# Patient Record
Sex: FEMALE | Race: WHITE | Employment: OTHER | URBAN - METROPOLITAN AREA
[De-identification: names, ages, dates, MRNs, and addresses within clinical notes are randomized per-mention and may not be internally consistent; named-entity substitution may affect disease eponyms.]

---

## 2023-09-15 ENCOUNTER — TELEPHONE (OUTPATIENT)
Age: 88
End: 2023-09-15

## 2023-09-15 NOTE — TELEPHONE ENCOUNTER
Kristy thorne-friend. States she is [address] will bring the POA paper work to us. Patient refused to come to appt today with us. Has another appt the 21 of sept. But says she will not come. Had very little food in house but kristy has taken care of that. Says she has bleeding on incontinence pads. Sleeping a lot. Had diarrhea today, incontinnet. Just feels she is not well, she has been patients neighbor and friend for over 30 years. Feels she has dementia. Lives alone, kristy checks on her daily.  advise

## 2023-09-16 PROCEDURE — 96365 THER/PROPH/DIAG IV INF INIT: CPT

## 2023-09-16 PROCEDURE — 99285 EMERGENCY DEPT VISIT HI MDM: CPT

## 2023-09-16 PROCEDURE — 96375 TX/PRO/DX INJ NEW DRUG ADDON: CPT

## 2023-09-16 PROCEDURE — 96361 HYDRATE IV INFUSION ADD-ON: CPT

## 2023-09-16 PROCEDURE — 96366 THER/PROPH/DIAG IV INF ADDON: CPT

## 2023-09-17 ENCOUNTER — APPOINTMENT (OUTPATIENT)
Dept: GENERAL RADIOLOGY | Age: 88
DRG: 840 | End: 2023-09-17
Payer: MEDICARE

## 2023-09-17 ENCOUNTER — HOSPITAL ENCOUNTER (EMERGENCY)
Age: 88
Discharge: ANOTHER HOSPITAL | DRG: 840 | End: 2023-09-17
Attending: EMERGENCY MEDICINE
Payer: MEDICARE

## 2023-09-17 ENCOUNTER — HOSPITAL ENCOUNTER (INPATIENT)
Age: 88
LOS: 12 days | Discharge: NURSING, REHAB OR LONG-TERM CARE | DRG: 840 | End: 2023-09-29
Attending: INTERNAL MEDICINE
Payer: MEDICARE

## 2023-09-17 VITALS
BODY MASS INDEX: 24.8 KG/M2 | TEMPERATURE: 98 F | OXYGEN SATURATION: 95 % | HEART RATE: 53 BPM | DIASTOLIC BLOOD PRESSURE: 82 MMHG | SYSTOLIC BLOOD PRESSURE: 155 MMHG | RESPIRATION RATE: 16 BRPM | WEIGHT: 140 LBS | HEIGHT: 63 IN

## 2023-09-17 DIAGNOSIS — E87.5 HYPERKALEMIA: ICD-10-CM

## 2023-09-17 DIAGNOSIS — N17.9 ACUTE RENAL FAILURE, UNSPECIFIED ACUTE RENAL FAILURE TYPE (HCC): Primary | ICD-10-CM

## 2023-09-17 DIAGNOSIS — R60.9 EDEMA, UNSPECIFIED TYPE: Primary | ICD-10-CM

## 2023-09-17 PROBLEM — K26.9 DUODENAL ULCER: Status: ACTIVE | Noted: 2018-02-07

## 2023-09-17 PROBLEM — I26.99 PULMONARY EMBOLISM (HCC): Status: ACTIVE | Noted: 2017-12-01

## 2023-09-17 LAB
ANION GAP SERPL CALCULATED.3IONS-SCNC: 15 MMOL/L (ref 9–17)
ANION GAP SERPL CALCULATED.3IONS-SCNC: 17 MMOL/L (ref 9–17)
ANION GAP SERPL CALCULATED.3IONS-SCNC: 17 MMOL/L (ref 9–17)
B PARAP IS1001 DNA NPH QL NAA+NON-PROBE: NOT DETECTED
B PERT DNA SPEC QL NAA+PROBE: NOT DETECTED
BACTERIA URNS QL MICRO: ABNORMAL
BASOPHILS # BLD: 0.03 K/UL (ref 0–0.2)
BASOPHILS # BLD: 0.03 K/UL (ref 0–0.2)
BASOPHILS NFR BLD: 0 % (ref 0–2)
BASOPHILS NFR BLD: 0 % (ref 0–2)
BILIRUB UR QL STRIP: NEGATIVE
BILIRUB UR QL STRIP: NEGATIVE
BNP SERPL-MCNC: 3153 PG/ML
BUN SERPL-MCNC: 62 MG/DL (ref 8–23)
BUN SERPL-MCNC: 65 MG/DL (ref 8–23)
BUN SERPL-MCNC: 70 MG/DL (ref 8–23)
C PNEUM DNA NPH QL NAA+NON-PROBE: NOT DETECTED
CALCIUM SERPL-MCNC: 8.7 MG/DL (ref 8.6–10.4)
CALCIUM SERPL-MCNC: 9.2 MG/DL (ref 8.6–10.4)
CALCIUM SERPL-MCNC: 9.3 MG/DL (ref 8.6–10.4)
CHARACTER UR: ABNORMAL
CHLORIDE SERPL-SCNC: 100 MMOL/L (ref 98–107)
CHLORIDE SERPL-SCNC: 104 MMOL/L (ref 98–107)
CHLORIDE SERPL-SCNC: 95 MMOL/L (ref 98–107)
CLARITY UR: CLEAR
CLARITY UR: CLEAR
CO2 SERPL-SCNC: 18 MMOL/L (ref 20–31)
CO2 SERPL-SCNC: 19 MMOL/L (ref 20–31)
CO2 SERPL-SCNC: 23 MMOL/L (ref 20–31)
COLOR UR: YELLOW
COLOR UR: YELLOW
CREAT SERPL-MCNC: 8.5 MG/DL (ref 0.5–0.9)
CREAT SERPL-MCNC: 8.6 MG/DL (ref 0.5–0.9)
CREAT SERPL-MCNC: 9 MG/DL (ref 0.5–0.9)
EOSINOPHIL # BLD: 0.08 K/UL (ref 0–0.44)
EOSINOPHIL # BLD: 0.16 K/UL (ref 0–0.4)
EOSINOPHILS RELATIVE PERCENT: 1 % (ref 1–4)
EOSINOPHILS RELATIVE PERCENT: 2 % (ref 1–4)
EPI CELLS #/AREA URNS HPF: ABNORMAL /HPF (ref 0–5)
EPI CELLS #/AREA URNS HPF: ABNORMAL /HPF (ref 0–5)
ERYTHROCYTE [DISTWIDTH] IN BLOOD BY AUTOMATED COUNT: 13.4 % (ref 11.8–14.4)
ERYTHROCYTE [DISTWIDTH] IN BLOOD BY AUTOMATED COUNT: 14.2 % (ref 12.5–15.4)
FLUAV RNA NPH QL NAA+NON-PROBE: NOT DETECTED
FLUBV RNA NPH QL NAA+NON-PROBE: NOT DETECTED
GFR SERPL CREATININE-BSD FRML MDRD: 4 ML/MIN/1.73M2
GLUCOSE BLD-MCNC: 101 MG/DL (ref 65–105)
GLUCOSE BLD-MCNC: 119 MG/DL (ref 65–105)
GLUCOSE BLD-MCNC: 173 MG/DL (ref 65–105)
GLUCOSE BLD-MCNC: 89 MG/DL (ref 65–105)
GLUCOSE BLD-MCNC: 92 MG/DL (ref 65–105)
GLUCOSE SERPL-MCNC: 102 MG/DL (ref 70–99)
GLUCOSE SERPL-MCNC: 97 MG/DL (ref 70–99)
GLUCOSE SERPL-MCNC: 99 MG/DL (ref 70–99)
GLUCOSE UR STRIP-MCNC: NEGATIVE MG/DL
GLUCOSE UR STRIP-MCNC: NEGATIVE MG/DL
HADV DNA NPH QL NAA+NON-PROBE: NOT DETECTED
HCOV 229E RNA NPH QL NAA+NON-PROBE: NOT DETECTED
HCOV HKU1 RNA NPH QL NAA+NON-PROBE: NOT DETECTED
HCOV NL63 RNA NPH QL NAA+NON-PROBE: NOT DETECTED
HCOV OC43 RNA NPH QL NAA+NON-PROBE: NOT DETECTED
HCT VFR BLD AUTO: 35 % (ref 36–46)
HCT VFR BLD AUTO: 37.2 % (ref 36.3–47.1)
HGB BLD-MCNC: 11.2 G/DL (ref 12–16)
HGB BLD-MCNC: 11.9 G/DL (ref 11.9–15.1)
HGB UR QL STRIP.AUTO: ABNORMAL
HGB UR QL STRIP.AUTO: NEGATIVE
HMPV RNA NPH QL NAA+NON-PROBE: NOT DETECTED
HPIV1 RNA NPH QL NAA+NON-PROBE: NOT DETECTED
HPIV2 RNA NPH QL NAA+NON-PROBE: NOT DETECTED
HPIV3 RNA NPH QL NAA+NON-PROBE: NOT DETECTED
HPIV4 RNA NPH QL NAA+NON-PROBE: NOT DETECTED
IMM GRANULOCYTES # BLD AUTO: 0.04 K/UL (ref 0–0.3)
IMM GRANULOCYTES NFR BLD: 1 %
INR PPP: 1.2
KETONES UR STRIP-MCNC: NEGATIVE MG/DL
KETONES UR STRIP-MCNC: NEGATIVE MG/DL
LEUKOCYTE ESTERASE UR QL STRIP: ABNORMAL
LEUKOCYTE ESTERASE UR QL STRIP: NEGATIVE
LYMPHOCYTES NFR BLD: 1.14 K/UL (ref 1.1–3.7)
LYMPHOCYTES NFR BLD: 1.28 K/UL (ref 1–4.8)
LYMPHOCYTES RELATIVE PERCENT: 15 % (ref 24–43)
LYMPHOCYTES RELATIVE PERCENT: 16 % (ref 24–44)
M PNEUMO DNA NPH QL NAA+NON-PROBE: NOT DETECTED
MCH RBC QN AUTO: 33.3 PG (ref 26–34)
MCH RBC QN AUTO: 33.6 PG (ref 25.2–33.5)
MCHC RBC AUTO-ENTMCNC: 32 G/DL (ref 28.4–34.8)
MCHC RBC AUTO-ENTMCNC: 32 G/DL (ref 31–37)
MCV RBC AUTO: 104.2 FL (ref 80–100)
MCV RBC AUTO: 105.1 FL (ref 82.6–102.9)
MONOCYTES NFR BLD: 0.77 K/UL (ref 0.1–1.2)
MONOCYTES NFR BLD: 0.88 K/UL (ref 0.1–1.2)
MONOCYTES NFR BLD: 10 % (ref 3–12)
MONOCYTES NFR BLD: 11 % (ref 2–11)
NEUTROPHILS NFR BLD: 71 % (ref 36–66)
NEUTROPHILS NFR BLD: 73 % (ref 36–65)
NEUTS SEG NFR BLD: 5.43 K/UL (ref 1.5–8.1)
NEUTS SEG NFR BLD: 5.7 K/UL (ref 1.8–7.7)
NITRITE UR QL STRIP: NEGATIVE
NITRITE UR QL STRIP: NEGATIVE
NRBC BLD-RTO: 0 PER 100 WBC
PARTIAL THROMBOPLASTIN TIME: 62.7 SEC (ref 23–36.5)
PH UR STRIP: 7 [PH] (ref 5–8)
PH UR STRIP: 8 [PH] (ref 5–8)
PLATELET # BLD AUTO: 208 K/UL (ref 138–453)
PLATELET # BLD AUTO: 251 K/UL (ref 140–450)
PMV BLD AUTO: 10.9 FL (ref 8.1–13.5)
PMV BLD AUTO: 11.6 FL (ref 8–14)
POTASSIUM SERPL-SCNC: 4.7 MMOL/L (ref 3.7–5.3)
POTASSIUM SERPL-SCNC: 5.4 MMOL/L (ref 3.7–5.3)
POTASSIUM SERPL-SCNC: 5.7 MMOL/L (ref 3.7–5.3)
POTASSIUM SERPL-SCNC: 6 MMOL/L (ref 3.7–5.3)
PROCALCITONIN SERPL-MCNC: 0.2 NG/ML
PROT UR STRIP-MCNC: ABNORMAL MG/DL
PROT UR STRIP-MCNC: ABNORMAL MG/DL
PROTHROMBIN TIME: 14.6 SEC (ref 11.7–14.9)
RBC # BLD AUTO: 3.36 M/UL (ref 4–5.2)
RBC # BLD AUTO: 3.54 M/UL (ref 3.95–5.11)
RBC # BLD: ABNORMAL 10*6/UL
RBC #/AREA URNS HPF: ABNORMAL /HPF (ref 0–2)
RBC #/AREA URNS HPF: ABNORMAL /HPF (ref 0–4)
RSV RNA NPH QL NAA+NON-PROBE: NOT DETECTED
RV+EV RNA NPH QL NAA+NON-PROBE: NOT DETECTED
SARS-COV-2 RNA NPH QL NAA+NON-PROBE: NOT DETECTED
SODIUM SERPL-SCNC: 133 MMOL/L (ref 135–144)
SODIUM SERPL-SCNC: 136 MMOL/L (ref 135–144)
SODIUM SERPL-SCNC: 139 MMOL/L (ref 135–144)
SODIUM UR-SCNC: 84 MMOL/L
SP GR UR STRIP: 1.01 (ref 1–1.03)
SP GR UR STRIP: 1.01 (ref 1–1.03)
SPECIMEN DESCRIPTION: NORMAL
TOTAL PROTEIN, URINE: 137 MG/DL
TROPONIN I SERPL HS-MCNC: 24 NG/L (ref 0–14)
TROPONIN I SERPL HS-MCNC: 26 NG/L (ref 0–14)
UROBILINOGEN UR STRIP-ACNC: NORMAL EU/DL (ref 0–1)
UROBILINOGEN UR STRIP-ACNC: NORMAL EU/DL (ref 0–1)
WBC #/AREA URNS HPF: ABNORMAL /HPF (ref 0–5)
WBC #/AREA URNS HPF: ABNORMAL /HPF (ref 0–5)
WBC OTHER # BLD: 7.5 K/UL (ref 3.5–11.3)
WBC OTHER # BLD: 8.1 K/UL (ref 3.5–11)

## 2023-09-17 PROCEDURE — 93005 ELECTROCARDIOGRAM TRACING: CPT | Performed by: EMERGENCY MEDICINE

## 2023-09-17 PROCEDURE — 6370000000 HC RX 637 (ALT 250 FOR IP): Performed by: EMERGENCY MEDICINE

## 2023-09-17 PROCEDURE — 81001 URINALYSIS AUTO W/SCOPE: CPT

## 2023-09-17 PROCEDURE — 87086 URINE CULTURE/COLONY COUNT: CPT

## 2023-09-17 PROCEDURE — 85610 PROTHROMBIN TIME: CPT

## 2023-09-17 PROCEDURE — 2580000003 HC RX 258

## 2023-09-17 PROCEDURE — 71045 X-RAY EXAM CHEST 1 VIEW: CPT

## 2023-09-17 PROCEDURE — 6370000000 HC RX 637 (ALT 250 FOR IP): Performed by: INTERNAL MEDICINE

## 2023-09-17 PROCEDURE — 87449 NOS EACH ORGANISM AG IA: CPT

## 2023-09-17 PROCEDURE — 2580000003 HC RX 258: Performed by: NURSE PRACTITIONER

## 2023-09-17 PROCEDURE — 84300 ASSAY OF URINE SODIUM: CPT

## 2023-09-17 PROCEDURE — 36415 COLL VENOUS BLD VENIPUNCTURE: CPT

## 2023-09-17 PROCEDURE — 84132 ASSAY OF SERUM POTASSIUM: CPT

## 2023-09-17 PROCEDURE — 6360000002 HC RX W HCPCS: Performed by: EMERGENCY MEDICINE

## 2023-09-17 PROCEDURE — 2500000003 HC RX 250 WO HCPCS: Performed by: EMERGENCY MEDICINE

## 2023-09-17 PROCEDURE — 2580000003 HC RX 258: Performed by: EMERGENCY MEDICINE

## 2023-09-17 PROCEDURE — 84484 ASSAY OF TROPONIN QUANT: CPT

## 2023-09-17 PROCEDURE — 99223 1ST HOSP IP/OBS HIGH 75: CPT | Performed by: INTERNAL MEDICINE

## 2023-09-17 PROCEDURE — 80048 BASIC METABOLIC PNL TOTAL CA: CPT

## 2023-09-17 PROCEDURE — 6370000000 HC RX 637 (ALT 250 FOR IP): Performed by: NURSE PRACTITIONER

## 2023-09-17 PROCEDURE — 85730 THROMBOPLASTIN TIME PARTIAL: CPT

## 2023-09-17 PROCEDURE — 84145 PROCALCITONIN (PCT): CPT

## 2023-09-17 PROCEDURE — 85025 COMPLETE CBC W/AUTO DIFF WBC: CPT

## 2023-09-17 PROCEDURE — 0202U NFCT DS 22 TRGT SARS-COV-2: CPT

## 2023-09-17 PROCEDURE — 84156 ASSAY OF PROTEIN URINE: CPT

## 2023-09-17 PROCEDURE — 2060000000 HC ICU INTERMEDIATE R&B

## 2023-09-17 PROCEDURE — 87324 CLOSTRIDIUM AG IA: CPT

## 2023-09-17 PROCEDURE — 6360000002 HC RX W HCPCS: Performed by: INTERNAL MEDICINE

## 2023-09-17 PROCEDURE — 82947 ASSAY GLUCOSE BLOOD QUANT: CPT

## 2023-09-17 PROCEDURE — 87040 BLOOD CULTURE FOR BACTERIA: CPT

## 2023-09-17 PROCEDURE — 83880 ASSAY OF NATRIURETIC PEPTIDE: CPT

## 2023-09-17 PROCEDURE — 99222 1ST HOSP IP/OBS MODERATE 55: CPT | Performed by: INTERNAL MEDICINE

## 2023-09-17 RX ORDER — SODIUM CHLORIDE 0.9 % (FLUSH) 0.9 %
5-40 SYRINGE (ML) INJECTION EVERY 12 HOURS SCHEDULED
Status: DISCONTINUED | OUTPATIENT
Start: 2023-09-17 | End: 2023-09-29 | Stop reason: HOSPADM

## 2023-09-17 RX ORDER — ONDANSETRON 4 MG/1
4 TABLET, ORALLY DISINTEGRATING ORAL EVERY 8 HOURS PRN
Status: DISCONTINUED | OUTPATIENT
Start: 2023-09-17 | End: 2023-09-29 | Stop reason: HOSPADM

## 2023-09-17 RX ORDER — SODIUM CHLORIDE 9 MG/ML
INJECTION, SOLUTION INTRAVENOUS CONTINUOUS
Status: DISCONTINUED | OUTPATIENT
Start: 2023-09-17 | End: 2023-09-18

## 2023-09-17 RX ORDER — SODIUM CHLORIDE 0.9 % (FLUSH) 0.9 %
5-40 SYRINGE (ML) INJECTION PRN
Status: DISCONTINUED | OUTPATIENT
Start: 2023-09-17 | End: 2023-09-29 | Stop reason: HOSPADM

## 2023-09-17 RX ORDER — AMLODIPINE BESYLATE 10 MG/1
10 TABLET ORAL DAILY
Status: DISCONTINUED | OUTPATIENT
Start: 2023-09-18 | End: 2023-09-29 | Stop reason: HOSPADM

## 2023-09-17 RX ORDER — HEPARIN SODIUM 1000 [USP'U]/ML
2000 INJECTION, SOLUTION INTRAVENOUS; SUBCUTANEOUS PRN
Status: DISCONTINUED | OUTPATIENT
Start: 2023-09-17 | End: 2023-09-26 | Stop reason: ALTCHOICE

## 2023-09-17 RX ORDER — ACETAMINOPHEN 325 MG/1
650 TABLET ORAL EVERY 6 HOURS PRN
Status: DISCONTINUED | OUTPATIENT
Start: 2023-09-17 | End: 2023-09-20

## 2023-09-17 RX ORDER — DEXTROSE MONOHYDRATE 100 MG/ML
INJECTION, SOLUTION INTRAVENOUS
Status: COMPLETED
Start: 2023-09-17 | End: 2023-09-17

## 2023-09-17 RX ORDER — DEXTROSE MONOHYDRATE 100 MG/ML
INJECTION, SOLUTION INTRAVENOUS CONTINUOUS PRN
Status: DISCONTINUED | OUTPATIENT
Start: 2023-09-17 | End: 2023-09-29 | Stop reason: HOSPADM

## 2023-09-17 RX ORDER — SODIUM POLYSTYRENE SULFONATE 15 G/60ML
15 SUSPENSION ORAL; RECTAL
Status: COMPLETED | OUTPATIENT
Start: 2023-09-17 | End: 2023-09-17

## 2023-09-17 RX ORDER — AMLODIPINE BESYLATE 5 MG/1
5 TABLET ORAL DAILY
Status: DISCONTINUED | OUTPATIENT
Start: 2023-09-17 | End: 2023-09-17

## 2023-09-17 RX ORDER — LEVOTHYROXINE SODIUM 0.1 MG/1
100 TABLET ORAL DAILY
Status: DISCONTINUED | OUTPATIENT
Start: 2023-09-17 | End: 2023-09-29 | Stop reason: HOSPADM

## 2023-09-17 RX ORDER — AMLODIPINE BESYLATE 5 MG/1
5 TABLET ORAL ONCE
Status: COMPLETED | OUTPATIENT
Start: 2023-09-17 | End: 2023-09-18

## 2023-09-17 RX ORDER — ONDANSETRON 2 MG/ML
4 INJECTION INTRAMUSCULAR; INTRAVENOUS EVERY 6 HOURS PRN
Status: DISCONTINUED | OUTPATIENT
Start: 2023-09-17 | End: 2023-09-29 | Stop reason: HOSPADM

## 2023-09-17 RX ORDER — ACETAMINOPHEN 650 MG/1
650 SUPPOSITORY RECTAL EVERY 6 HOURS PRN
Status: DISCONTINUED | OUTPATIENT
Start: 2023-09-17 | End: 2023-09-29 | Stop reason: HOSPADM

## 2023-09-17 RX ORDER — DEXTROSE MONOHYDRATE 25 G/50ML
25 INJECTION, SOLUTION INTRAVENOUS ONCE
Status: DISCONTINUED | OUTPATIENT
Start: 2023-09-17 | End: 2023-09-17 | Stop reason: HOSPADM

## 2023-09-17 RX ORDER — HEPARIN SODIUM 5000 [USP'U]/ML
5000 INJECTION, SOLUTION INTRAVENOUS; SUBCUTANEOUS EVERY 8 HOURS SCHEDULED
Status: DISCONTINUED | OUTPATIENT
Start: 2023-09-17 | End: 2023-09-17

## 2023-09-17 RX ORDER — HEPARIN SODIUM 10000 [USP'U]/100ML
5-30 INJECTION, SOLUTION INTRAVENOUS CONTINUOUS
Status: DISCONTINUED | OUTPATIENT
Start: 2023-09-17 | End: 2023-09-21

## 2023-09-17 RX ORDER — CALCIUM GLUCONATE 94 MG/ML
1000 INJECTION, SOLUTION INTRAVENOUS ONCE
Status: COMPLETED | OUTPATIENT
Start: 2023-09-17 | End: 2023-09-17

## 2023-09-17 RX ORDER — INSULIN LISPRO 100 [IU]/ML
0-8 INJECTION, SOLUTION INTRAVENOUS; SUBCUTANEOUS
Status: DISCONTINUED | OUTPATIENT
Start: 2023-09-17 | End: 2023-09-26

## 2023-09-17 RX ORDER — 0.9 % SODIUM CHLORIDE 0.9 %
500 INTRAVENOUS SOLUTION INTRAVENOUS ONCE
Status: COMPLETED | OUTPATIENT
Start: 2023-09-17 | End: 2023-09-17

## 2023-09-17 RX ORDER — HYDRALAZINE HYDROCHLORIDE 20 MG/ML
10 INJECTION INTRAMUSCULAR; INTRAVENOUS EVERY 6 HOURS PRN
Status: DISCONTINUED | OUTPATIENT
Start: 2023-09-17 | End: 2023-09-27

## 2023-09-17 RX ORDER — INSULIN LISPRO 100 [IU]/ML
0-4 INJECTION, SOLUTION INTRAVENOUS; SUBCUTANEOUS NIGHTLY
Status: DISCONTINUED | OUTPATIENT
Start: 2023-09-17 | End: 2023-09-26

## 2023-09-17 RX ORDER — AMLODIPINE BESYLATE 5 MG/1
5 TABLET ORAL ONCE
Status: COMPLETED | OUTPATIENT
Start: 2023-09-17 | End: 2023-09-17

## 2023-09-17 RX ORDER — HEPARIN SODIUM 10000 [USP'U]/100ML
5-30 INJECTION, SOLUTION INTRAVENOUS CONTINUOUS
Status: DISCONTINUED | OUTPATIENT
Start: 2023-09-17 | End: 2023-09-17 | Stop reason: SDUPTHER

## 2023-09-17 RX ORDER — ATENOLOL 50 MG/1
50 TABLET ORAL DAILY
Status: DISCONTINUED | OUTPATIENT
Start: 2023-09-17 | End: 2023-09-17 | Stop reason: HOSPADM

## 2023-09-17 RX ORDER — SODIUM CHLORIDE 9 MG/ML
INJECTION, SOLUTION INTRAVENOUS PRN
Status: DISCONTINUED | OUTPATIENT
Start: 2023-09-17 | End: 2023-09-20

## 2023-09-17 RX ORDER — HEPARIN SODIUM 1000 [USP'U]/ML
4000 INJECTION, SOLUTION INTRAVENOUS; SUBCUTANEOUS PRN
Status: DISCONTINUED | OUTPATIENT
Start: 2023-09-17 | End: 2023-09-26 | Stop reason: ALTCHOICE

## 2023-09-17 RX ADMIN — CALCIUM GLUCONATE 1000 MG: 98 INJECTION, SOLUTION INTRAVENOUS at 02:23

## 2023-09-17 RX ADMIN — AMLODIPINE BESYLATE 5 MG: 5 TABLET ORAL at 08:59

## 2023-09-17 RX ADMIN — SODIUM CHLORIDE 500 ML: 9 INJECTION, SOLUTION INTRAVENOUS at 01:49

## 2023-09-17 RX ADMIN — LEVOTHYROXINE SODIUM 100 MCG: 100 TABLET ORAL at 15:36

## 2023-09-17 RX ADMIN — SODIUM ZIRCONIUM CYCLOSILICATE 5 G: 5 POWDER, FOR SUSPENSION ORAL at 13:08

## 2023-09-17 RX ADMIN — SODIUM ZIRCONIUM CYCLOSILICATE 5 G: 5 POWDER, FOR SUSPENSION ORAL at 20:31

## 2023-09-17 RX ADMIN — HYDRALAZINE HYDROCHLORIDE 10 MG: 20 INJECTION INTRAMUSCULAR; INTRAVENOUS at 20:31

## 2023-09-17 RX ADMIN — HEPARIN SODIUM 12 UNITS/KG/HR: 10000 INJECTION, SOLUTION INTRAVENOUS at 17:45

## 2023-09-17 RX ADMIN — HYDRALAZINE HYDROCHLORIDE 10 MG: 20 INJECTION INTRAMUSCULAR; INTRAVENOUS at 15:34

## 2023-09-17 RX ADMIN — SODIUM BICARBONATE 50 MEQ: 84 INJECTION INTRAVENOUS at 01:42

## 2023-09-17 RX ADMIN — SODIUM POLYSTYRENE SULFONATE 15 G: 15 SUSPENSION ORAL; RECTAL at 15:35

## 2023-09-17 RX ADMIN — SODIUM CHLORIDE: 9 INJECTION, SOLUTION INTRAVENOUS at 12:35

## 2023-09-17 RX ADMIN — INSULIN HUMAN 5 UNITS: 100 INJECTION, SOLUTION PARENTERAL at 01:43

## 2023-09-17 RX ADMIN — DEXTROSE MONOHYDRATE 250 ML: 100 INJECTION, SOLUTION INTRAVENOUS at 01:50

## 2023-09-17 ASSESSMENT — PAIN DESCRIPTION - DESCRIPTORS: DESCRIPTORS: OTHER (COMMENT)

## 2023-09-17 ASSESSMENT — PAIN DESCRIPTION - LOCATION: LOCATION: GENERALIZED

## 2023-09-17 ASSESSMENT — PAIN - FUNCTIONAL ASSESSMENT: PAIN_FUNCTIONAL_ASSESSMENT: 0-10

## 2023-09-17 NOTE — PROGRESS NOTES
Notified Dr. Kat Banegas that pt had incontinence of bladder and saturated brief and filled canister to 80 cc. PVR was 200. No new orders at this time.

## 2023-09-17 NOTE — CARE COORDINATION
Case Management Assessment  Initial Evaluation    Date/Time of Evaluation: 9/17/2023 3:52 PM  Assessment Completed by: Naresh Terry RN    If patient is discharged prior to next notation, then this note serves as note for discharge by case management. Patient Name: Julian Hanson                   YOB: 1934  Diagnosis: Acute kidney injury [facility]) [N17.9]  DARIUSZ (acute kidney injury) ([address]) [N17.9]                   Date / Time: 9/17/2023 12:17 PM    Patient Admission Status: Inpatient   Readmission Risk (Low < 19, Mod (19-27), High > 27): No data recorded  Current PCP: Amelia Garnett MD  PCP verified by CM? (P) Yes (Dr. Milad Thompson, neftaly)    Chart Reviewed: Yes      History Provided by: (P) Patient  Patient Orientation: (P) Alert and Oriented, Person, Place, Situation, Other (see comment), Self (could not remember the year.)    Patient Cognition: (P) Alert    Hospitalization in the last 30 days (Readmission):  No    If yes, Readmission Assessment in CM Navigator will be completed.     Advance Directives:      Code Status: Limited   Patient's Primary Decision Maker is: (P) Named in Scanned ACP Document      Discharge Planning:    Patient lives with: (P) Alone Type of Home: (P) House  Primary Care Giver:    Patient Support Systems include: (P) Family Members, Friends/Neighbors   Current Financial resources:    Current community resources:    Current services prior to admission: (P) Durable Medical Equipment            Current DME: (P) Walker, Shower Chair            Type of Home Care services:  Nursing Services    ADLS  Prior functional level: (P) Assistance with the following:, Cooking, Housework, Shopping, Mobility, Other (see comment) (Transportation)  Current functional level: (P) Assistance with the following:, Cooking, Housework, Shopping, Mobility, Other (see comment) (Transportation)    PT AM-PAC:   /24  OT AM-PAC:   /24    Family can provide assistance at DC: (P) Other (comment) (Friends able to

## 2023-09-17 NOTE — H&P
[facility]  Office: [phone]  Stefani Joy DO, Leticia Reyes DO, Sofiya Gann DO, Jose Suh MD, Angela Andrade MD, Artur Sanderson MD, Yandel Garcia MD,  Kenny Peacock MD, Ty Clark MD, Nic George DO, Stephane Arias MD,  Wing Bhaskar MD, Anny Ross MD, Joan Noel DO, Rin Kaufman MD,  Han Donis MD, Michael Ashley MD, Bairon Garcia MD, Michael Garcia MD,  Ji Naik MD, Cynthia Hansen MD, Yecenia Mckeon MD, Fatmata Ortiz MD, Adelso Rothman DO, Lexx Mckeon MD,  José Manuel Carr MD, Ariadna Torres MD, Henri Matute, CNP,  Christa Downey, CNP,, Mechelle Brock, CNP,  Adelina Tucker, [facility], Joseph Thakur, CNP, Dimitrios Christianson, CNP, Alexsander Rocha, CNP, Rafa Hogue, CNP, Kristofer Russell, CNP, Naresh Michele, CNP, Jennie Becerril, CNS, Irene Marie, CNP, Gisele Camarillo, [address] [place] [address]    HISTORY AND PHYSICAL EXAMINATION            Date:   9/17/2023  Patient name:  Ariadna Torers  Date of admission:  9/17/2023 12:17 PM  MRN:   [id number]  Account:  [de-identified]  YOB: 1934  PCP:    Moustapha Andersen MD  Room:   [facility] [place]050-  Code Status:    Limited    Chief Complaint:     Fatigue, weakness    History Obtained From:     patient, electronic medical record, neighbours    History of Present Illness: This is 80years old female who was transferred to us from [facility].  Patient presented to the ER with 2 weeks history of weakness and lethargy. Patient was found to be in acute kidney injury with creatinine of more than 8 and potassium of more than 6. She was transferred over here for further evaluation. Patient says that she has not been feeling well for the last 2 weeks. She just did not have an appetite. She was not eating and drinking well. Denies chest pain. Denies nausea or vomiting.   She also with Auto Differential    Collection Time: 09/17/23 12:57 PM   Result Value Ref Range    WBC 7.5 3.5 - 11.3 k/uL    RBC 3.54 (L) 3.95 - 5.11 m/uL    Hemoglobin 11.9 11.9 - 15.1 g/dL    Hematocrit 37.2 36.3 - 47.1 %    .1 (H) 82.6 - 102.9 fL    MCH 33.6 (H) 25.2 - 33.5 pg    MCHC 32.0 28.4 - 34.8 g/dL    RDW 13.4 11.8 - 14.4 %    Platelets 807 823 - 062 k/uL    MPV 10.9 8.1 - 13.5 fL    NRBC Automated 0.0 0.0 per 100 WBC    Neutrophils % 73 (H) 36 - 65 %    Lymphocytes % 15 (L) 24 - 43 %    Monocytes % 10 3 - 12 %    Eosinophils % 1 1 - 4 %    Basophils % 0 0 - 2 %    Immature Granulocytes 1 (H) 0 %    Neutrophils Absolute 5.43 1.50 - 8.10 k/uL    Lymphocytes Absolute 1.14 1.10 - 3.70 k/uL    Monocytes Absolute 0.77 0.10 - 1.20 k/uL    Eosinophils Absolute 0.08 0.00 - 0.44 k/uL    Basophils Absolute 0.03 0.00 - 0.20 k/uL    Absolute Immature Granulocyte 0.04 0.00 - 0.30 k/uL    RBC Morphology MACROCYTOSIS PRESENT    Basic Metabolic Panel w/ Reflex to MG    Collection Time: 09/17/23 12:57 PM   Result Value Ref Range    Sodium 136 135 - 144 mmol/L    Potassium 5.7 (H) 3.7 - 5.3 mmol/L    Chloride 100 98 - 107 mmol/L    CO2 19 (L) 20 - 31 mmol/L    Anion Gap 17 9 - 17 mmol/L    Glucose 97 70 - 99 mg/dL    BUN 65 (H) 8 - 23 mg/dL    Creatinine 8.6 (HH) 0.5 - 0.9 mg/dL    Est, Glom Filt Rate 4 (L) >60 mL/min/1.73m2    Calcium 9.2 8.6 - 10.4 mg/dL   Troponin    Collection Time: 09/17/23 12:57 PM   Result Value Ref Range    Troponin, High Sensitivity 26 (H) 0 - 14 ng/L   Procalcitonin    Collection Time: 09/17/23 12:57 PM   Result Value Ref Range    Procalcitonin 0.20 (H) <0.09 ng/mL   POC Glucose Fingerstick    Collection Time: 09/17/23 12:58 PM   Result Value Ref Range    POC Glucose 92 65 - 105 mg/dL       Imaging/Diagnostics:  XR CHEST PORTABLE    Result Date: 9/17/2023  1. Pulmonary vascular congestion and mild to moderate cardiomegaly.   Minimal patchy airspace opacities in the mid to basilar lungs could
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
## 2023-09-17 NOTE — PROGRESS NOTES
Notified Dr. Elvin Shankar and Dr. Donna Onofre that patients K+ at noon was 5.7. Treated with PRN. See MAR.

## 2023-09-17 NOTE — ED NOTES
Writer gave nurse to nurse report to [facility].       Libby Perea LPN  02/77/37 0415

## 2023-09-18 ENCOUNTER — APPOINTMENT (OUTPATIENT)
Dept: CT IMAGING | Age: 88
DRG: 840 | End: 2023-09-18
Attending: INTERNAL MEDICINE
Payer: MEDICARE

## 2023-09-18 ENCOUNTER — APPOINTMENT (OUTPATIENT)
Dept: ULTRASOUND IMAGING | Age: 88
DRG: 840 | End: 2023-09-18
Attending: INTERNAL MEDICINE
Payer: MEDICARE

## 2023-09-18 ENCOUNTER — APPOINTMENT (OUTPATIENT)
Dept: INTERVENTIONAL RADIOLOGY/VASCULAR | Age: 88
DRG: 840 | End: 2023-09-18
Attending: INTERNAL MEDICINE
Payer: MEDICARE

## 2023-09-18 PROBLEM — R41.0 CONFUSION: Status: ACTIVE | Noted: 2023-09-18

## 2023-09-18 PROBLEM — E87.5 HYPERKALEMIA: Status: ACTIVE | Noted: 2023-09-18

## 2023-09-18 PROBLEM — Z71.89 GOALS OF CARE, COUNSELING/DISCUSSION: Status: ACTIVE | Noted: 2023-09-18

## 2023-09-18 PROBLEM — Z51.5 ENCOUNTER FOR PALLIATIVE CARE: Status: ACTIVE | Noted: 2023-09-18

## 2023-09-18 LAB
ALBUMIN SERPL-MCNC: 3.4 G/DL (ref 3.5–5.2)
ALBUMIN/GLOB SERPL: 1.3 {RATIO} (ref 1–2.5)
ALP SERPL-CCNC: 74 U/L (ref 35–104)
ALT SERPL-CCNC: 6 U/L (ref 5–33)
ANION GAP SERPL CALCULATED.3IONS-SCNC: 16 MMOL/L (ref 9–17)
AST SERPL-CCNC: 11 U/L
BASOPHILS # BLD: <0.03 K/UL (ref 0–0.2)
BASOPHILS NFR BLD: 0 % (ref 0–2)
BILIRUB SERPL-MCNC: 0.4 MG/DL (ref 0.3–1.2)
BILIRUB UR QL STRIP: NEGATIVE
BUN SERPL-MCNC: 62 MG/DL (ref 8–23)
C DIFF GDH + TOXINS A+B STL QL IA.RAPID: NEGATIVE
C3 SERPL-MCNC: 113 MG/DL (ref 90–180)
C4 SERPL-MCNC: 38 MG/DL (ref 10–40)
CALCIUM SERPL-MCNC: 8.6 MG/DL (ref 8.6–10.4)
CASTS #/AREA URNS LPF: ABNORMAL /LPF (ref 0–8)
CHLORIDE SERPL-SCNC: 103 MMOL/L (ref 98–107)
CHLORIDE UR-SCNC: 41 MMOL/L
CLARITY UR: CLEAR
CO2 SERPL-SCNC: 20 MMOL/L (ref 20–31)
COLOR UR: YELLOW
CREAT SERPL-MCNC: 8.4 MG/DL (ref 0.5–0.9)
CREAT UR-MCNC: 90.8 MG/DL (ref 28–217)
EKG ATRIAL RATE: 54 BPM
EKG ATRIAL RATE: 58 BPM
EKG P AXIS: 109 DEGREES
EKG P AXIS: 11 DEGREES
EKG P-R INTERVAL: 170 MS
EKG P-R INTERVAL: 174 MS
EKG Q-T INTERVAL: 440 MS
EKG Q-T INTERVAL: 448 MS
EKG QRS DURATION: 72 MS
EKG QRS DURATION: 78 MS
EKG QTC CALCULATION (BAZETT): 417 MS
EKG QTC CALCULATION (BAZETT): 439 MS
EKG R AXIS: 28 DEGREES
EKG R AXIS: 6 DEGREES
EKG T AXIS: 29 DEGREES
EKG T AXIS: 38 DEGREES
EKG VENTRICULAR RATE: 54 BPM
EKG VENTRICULAR RATE: 58 BPM
EOSINOPHIL # BLD: 0.03 K/UL (ref 0–0.44)
EOSINOPHILS RELATIVE PERCENT: 0 % (ref 1–4)
EPI CELLS #/AREA URNS HPF: ABNORMAL /HPF (ref 0–5)
ERYTHROCYTE [DISTWIDTH] IN BLOOD BY AUTOMATED COUNT: 14 % (ref 11.8–14.4)
FREE KAPPA/LAMBDA RATIO: 551.38 (ref 0.26–1.65)
GFR SERPL CREATININE-BSD FRML MDRD: 4 ML/MIN/1.73M2
GLUCOSE BLD-MCNC: 109 MG/DL (ref 65–105)
GLUCOSE BLD-MCNC: 114 MG/DL (ref 65–105)
GLUCOSE BLD-MCNC: 130 MG/DL (ref 65–105)
GLUCOSE BLD-MCNC: 95 MG/DL (ref 65–105)
GLUCOSE SERPL-MCNC: 100 MG/DL (ref 70–99)
GLUCOSE UR STRIP-MCNC: NEGATIVE MG/DL
HAV IGM SERPL QL IA: NONREACTIVE
HBV CORE AB SER QL: NONREACTIVE
HBV CORE IGM SERPL QL IA: NONREACTIVE
HBV SURFACE AB SERPL IA-ACNC: 40.83 MIU/ML
HBV SURFACE AG SERPL QL IA: NONREACTIVE
HBV SURFACE AG SERPL QL IA: NONREACTIVE
HCT VFR BLD AUTO: 33.6 % (ref 36.3–47.1)
HCT VFR BLD AUTO: 45.2 % (ref 36.3–47.1)
HCV AB SERPL QL IA: NONREACTIVE
HCV AB SERPL QL IA: NONREACTIVE
HGB BLD-MCNC: 11 G/DL (ref 11.9–15.1)
HGB BLD-MCNC: 14.9 G/DL (ref 11.9–15.1)
HGB UR QL STRIP.AUTO: ABNORMAL
IMM GRANULOCYTES # BLD AUTO: 0.05 K/UL (ref 0–0.3)
IMM GRANULOCYTES NFR BLD: 1 %
INR PPP: 1.3
KAPPA LC FREE SER-MCNC: 7168 MG/L (ref 3.7–19.4)
KETONES UR STRIP-MCNC: NEGATIVE MG/DL
LAMBDA LC FREE SERPL-MCNC: 13 MG/L (ref 5.7–26.3)
LEUKOCYTE ESTERASE UR QL STRIP: NEGATIVE
LYMPHOCYTES NFR BLD: 1.2 K/UL (ref 1.1–3.7)
LYMPHOCYTES RELATIVE PERCENT: 15 % (ref 24–43)
MAGNESIUM SERPL-MCNC: 2.1 MG/DL (ref 1.6–2.6)
MCH RBC QN AUTO: 34.3 PG (ref 25.2–33.5)
MCHC RBC AUTO-ENTMCNC: 32.7 G/DL (ref 28.4–34.8)
MCV RBC AUTO: 104.7 FL (ref 82.6–102.9)
MICROORGANISM SPEC CULT: NORMAL
MONOCYTES NFR BLD: 0.72 K/UL (ref 0.1–1.2)
MONOCYTES NFR BLD: 9 % (ref 3–12)
NEUTROPHILS NFR BLD: 74 % (ref 36–65)
NEUTS SEG NFR BLD: 5.82 K/UL (ref 1.5–8.1)
NITRITE UR QL STRIP: NEGATIVE
NRBC BLD-RTO: 0 PER 100 WBC
PARTIAL THROMBOPLASTIN TIME: 40.1 SEC (ref 23–36.5)
PARTIAL THROMBOPLASTIN TIME: >180 SEC (ref 23–36.5)
PH UR STRIP: 6.5 [PH] (ref 5–8)
PLATELET # BLD AUTO: 230 K/UL (ref 138–453)
PMV BLD AUTO: 11.4 FL (ref 8.1–13.5)
POTASSIUM SERPL-SCNC: 4.4 MMOL/L (ref 3.7–5.3)
PROCALCITONIN SERPL-MCNC: 0.2 NG/ML
PROT SERPL-MCNC: 6.1 G/DL (ref 6.4–8.3)
PROT UR STRIP-MCNC: ABNORMAL MG/DL
PROTHROMBIN TIME: 15.8 SEC (ref 11.7–14.9)
RBC # BLD AUTO: 3.21 M/UL (ref 3.95–5.11)
RBC # BLD: ABNORMAL 10*6/UL
RBC #/AREA URNS HPF: ABNORMAL /HPF (ref 0–4)
SODIUM SERPL-SCNC: 139 MMOL/L (ref 135–144)
SODIUM UR-SCNC: 61 MMOL/L
SP GR UR STRIP: 1.01 (ref 1–1.03)
SPECIMEN DESCRIPTION: NORMAL
SPECIMEN DESCRIPTION: NORMAL
T4 FREE SERPL-MCNC: 1 NG/DL (ref 0.9–1.7)
TOTAL PROTEIN, URINE: 252 MG/DL
TROPONIN I SERPL HS-MCNC: 29 NG/L (ref 0–14)
TSH SERPL DL<=0.05 MIU/L-ACNC: 8.26 UIU/ML (ref 0.3–5)
URINE TOTAL PROTEIN CREATININE RATIO: 2.78 (ref 0–0.2)
UROBILINOGEN UR STRIP-ACNC: NORMAL EU/DL (ref 0–1)
WBC #/AREA URNS HPF: ABNORMAL /HPF (ref 0–5)
WBC OTHER # BLD: 7.8 K/UL (ref 3.5–11.3)

## 2023-09-18 PROCEDURE — 05HP33Z INSERTION OF INFUSION DEVICE INTO RIGHT EXTERNAL JUGULAR VEIN, PERCUTANEOUS APPROACH: ICD-10-PCS | Performed by: RADIOLOGY

## 2023-09-18 PROCEDURE — 84165 PROTEIN E-PHORESIS SERUM: CPT

## 2023-09-18 PROCEDURE — 6360000002 HC RX W HCPCS: Performed by: INTERNAL MEDICINE

## 2023-09-18 PROCEDURE — 87340 HEPATITIS B SURFACE AG IA: CPT

## 2023-09-18 PROCEDURE — 84443 ASSAY THYROID STIM HORMONE: CPT

## 2023-09-18 PROCEDURE — 36415 COLL VENOUS BLD VENIPUNCTURE: CPT

## 2023-09-18 PROCEDURE — 86225 DNA ANTIBODY NATIVE: CPT

## 2023-09-18 PROCEDURE — 90935 HEMODIALYSIS ONE EVALUATION: CPT

## 2023-09-18 PROCEDURE — 85014 HEMATOCRIT: CPT

## 2023-09-18 PROCEDURE — 36556 INSERT NON-TUNNEL CV CATH: CPT

## 2023-09-18 PROCEDURE — 84156 ASSAY OF PROTEIN URINE: CPT

## 2023-09-18 PROCEDURE — 2060000000 HC ICU INTERMEDIATE R&B

## 2023-09-18 PROCEDURE — C1894 INTRO/SHEATH, NON-LASER: HCPCS

## 2023-09-18 PROCEDURE — 84155 ASSAY OF PROTEIN SERUM: CPT

## 2023-09-18 PROCEDURE — 86334 IMMUNOFIX E-PHORESIS SERUM: CPT

## 2023-09-18 PROCEDURE — 84145 PROCALCITONIN (PCT): CPT

## 2023-09-18 PROCEDURE — 83735 ASSAY OF MAGNESIUM: CPT

## 2023-09-18 PROCEDURE — 86704 HEP B CORE ANTIBODY TOTAL: CPT

## 2023-09-18 PROCEDURE — 93010 ELECTROCARDIOGRAM REPORT: CPT | Performed by: INTERNAL MEDICINE

## 2023-09-18 PROCEDURE — 80053 COMPREHEN METABOLIC PANEL: CPT

## 2023-09-18 PROCEDURE — 84484 ASSAY OF TROPONIN QUANT: CPT

## 2023-09-18 PROCEDURE — 84166 PROTEIN E-PHORESIS/URINE/CSF: CPT

## 2023-09-18 PROCEDURE — 99232 SBSQ HOSP IP/OBS MODERATE 35: CPT | Performed by: INTERNAL MEDICINE

## 2023-09-18 PROCEDURE — 86160 COMPLEMENT ANTIGEN: CPT

## 2023-09-18 PROCEDURE — 76775 US EXAM ABDO BACK WALL LIM: CPT

## 2023-09-18 PROCEDURE — 70450 CT HEAD/BRAIN W/O DYE: CPT

## 2023-09-18 PROCEDURE — 85730 THROMBOPLASTIN TIME PARTIAL: CPT

## 2023-09-18 PROCEDURE — 6370000000 HC RX 637 (ALT 250 FOR IP): Performed by: INTERNAL MEDICINE

## 2023-09-18 PROCEDURE — 51702 INSERT TEMP BLADDER CATH: CPT

## 2023-09-18 PROCEDURE — 80074 ACUTE HEPATITIS PANEL: CPT

## 2023-09-18 PROCEDURE — 93005 ELECTROCARDIOGRAM TRACING: CPT | Performed by: STUDENT IN AN ORGANIZED HEALTH CARE EDUCATION/TRAINING PROGRAM

## 2023-09-18 PROCEDURE — 84439 ASSAY OF FREE THYROXINE: CPT

## 2023-09-18 PROCEDURE — 77001 FLUOROGUIDE FOR VEIN DEVICE: CPT

## 2023-09-18 PROCEDURE — 83521 IG LIGHT CHAINS FREE EACH: CPT

## 2023-09-18 PROCEDURE — 6360000002 HC RX W HCPCS: Performed by: PHYSICIAN ASSISTANT

## 2023-09-18 PROCEDURE — 86803 HEPATITIS C AB TEST: CPT

## 2023-09-18 PROCEDURE — 86335 IMMUNFIX E-PHORSIS/URINE/CSF: CPT

## 2023-09-18 PROCEDURE — 86038 ANTINUCLEAR ANTIBODIES: CPT

## 2023-09-18 PROCEDURE — 74176 CT ABD & PELVIS W/O CONTRAST: CPT

## 2023-09-18 PROCEDURE — 2580000003 HC RX 258: Performed by: NURSE PRACTITIONER

## 2023-09-18 PROCEDURE — 82570 ASSAY OF URINE CREATININE: CPT

## 2023-09-18 PROCEDURE — 76937 US GUIDE VASCULAR ACCESS: CPT

## 2023-09-18 PROCEDURE — 99223 1ST HOSP IP/OBS HIGH 75: CPT

## 2023-09-18 PROCEDURE — 86317 IMMUNOASSAY INFECTIOUS AGENT: CPT

## 2023-09-18 PROCEDURE — 85610 PROTHROMBIN TIME: CPT

## 2023-09-18 PROCEDURE — 82947 ASSAY GLUCOSE BLOOD QUANT: CPT

## 2023-09-18 PROCEDURE — 85018 HEMOGLOBIN: CPT

## 2023-09-18 PROCEDURE — 85025 COMPLETE CBC W/AUTO DIFF WBC: CPT

## 2023-09-18 PROCEDURE — 82436 ASSAY OF URINE CHLORIDE: CPT

## 2023-09-18 PROCEDURE — 81001 URINALYSIS AUTO W/SCOPE: CPT

## 2023-09-18 PROCEDURE — 5A1D70Z PERFORMANCE OF URINARY FILTRATION, INTERMITTENT, LESS THAN 6 HOURS PER DAY: ICD-10-PCS | Performed by: INTERNAL MEDICINE

## 2023-09-18 PROCEDURE — 84300 ASSAY OF URINE SODIUM: CPT

## 2023-09-18 RX ORDER — HEPARIN SODIUM 1000 [USP'U]/ML
1000 INJECTION, SOLUTION INTRAVENOUS; SUBCUTANEOUS PRN
Status: DISCONTINUED | OUTPATIENT
Start: 2023-09-18 | End: 2023-09-26

## 2023-09-18 RX ORDER — LACTOBACILLUS RHAMNOSUS GG 10B CELL
1 CAPSULE ORAL
Status: DISCONTINUED | OUTPATIENT
Start: 2023-09-19 | End: 2023-09-29 | Stop reason: HOSPADM

## 2023-09-18 RX ORDER — HEPARIN SODIUM 1000 [USP'U]/ML
INJECTION, SOLUTION INTRAVENOUS; SUBCUTANEOUS PRN
Status: COMPLETED | OUTPATIENT
Start: 2023-09-18 | End: 2023-09-18

## 2023-09-18 RX ORDER — DOXYCYCLINE HYCLATE 100 MG
100 TABLET ORAL EVERY 12 HOURS SCHEDULED
Status: COMPLETED | OUTPATIENT
Start: 2023-09-18 | End: 2023-09-25

## 2023-09-18 RX ORDER — HEPARIN SODIUM 1000 [USP'U]/ML
1200 INJECTION, SOLUTION INTRAVENOUS; SUBCUTANEOUS PRN
Status: DISCONTINUED | OUTPATIENT
Start: 2023-09-18 | End: 2023-09-26

## 2023-09-18 RX ADMIN — HEPARIN SODIUM 1000 UNITS: 1000 INJECTION INTRAVENOUS; SUBCUTANEOUS at 19:15

## 2023-09-18 RX ADMIN — HEPARIN SODIUM 1000 UNITS: 1000 INJECTION, SOLUTION INTRAVENOUS; SUBCUTANEOUS at 16:45

## 2023-09-18 RX ADMIN — LEVOTHYROXINE SODIUM 100 MCG: 100 TABLET ORAL at 08:19

## 2023-09-18 RX ADMIN — SODIUM ZIRCONIUM CYCLOSILICATE 5 G: 5 POWDER, FOR SUSPENSION ORAL at 08:20

## 2023-09-18 RX ADMIN — SODIUM CHLORIDE, PRESERVATIVE FREE 10 ML: 5 INJECTION INTRAVENOUS at 21:21

## 2023-09-18 RX ADMIN — HEPARIN SODIUM 2000 UNITS: 1000 INJECTION INTRAVENOUS; SUBCUTANEOUS at 00:25

## 2023-09-18 RX ADMIN — HEPARIN SODIUM 14 UNITS/KG/HR: 10000 INJECTION, SOLUTION INTRAVENOUS at 00:27

## 2023-09-18 RX ADMIN — DOXYCYCLINE HYCLATE 100 MG: 100 TABLET ORAL at 21:21

## 2023-09-18 RX ADMIN — AMLODIPINE BESYLATE 5 MG: 5 TABLET ORAL at 00:42

## 2023-09-18 RX ADMIN — AMLODIPINE BESYLATE 10 MG: 10 TABLET ORAL at 08:19

## 2023-09-18 RX ADMIN — HEPARIN SODIUM 1200 UNITS: 1000 INJECTION, SOLUTION INTRAVENOUS; SUBCUTANEOUS at 16:44

## 2023-09-18 RX ADMIN — HYDRALAZINE HYDROCHLORIDE 10 MG: 20 INJECTION INTRAMUSCULAR; INTRAVENOUS at 01:50

## 2023-09-18 RX ADMIN — SODIUM CHLORIDE, PRESERVATIVE FREE 10 ML: 5 INJECTION INTRAVENOUS at 08:20

## 2023-09-18 RX ADMIN — HEPARIN SODIUM 1200 UNITS: 1000 INJECTION INTRAVENOUS; SUBCUTANEOUS at 19:15

## 2023-09-18 NOTE — ACP (ADVANCE CARE PLANNING)
Advance Care Planning     Advance Care Planning (ACP) Physician/NP/PA Conversation    Date of Conversation: 9/17/2023  Conducted with: Patient with Decision Making Capacity    Healthcare Decision Maker:    Primary Decision Maker: Delores Smith - [phone]  Click here to complete Healthcare Decision Makers including selection of the Healthcare Decision Maker Relationship (ie \"Primary\"). Care Preferences:    Hospitalization: \"If your health worsens and it becomes clear that your chance of recovery is unlikely, what would be your preference regarding hospitalization? \"  The patient would prefer hospitalization. Ventilation: \"If you were unable to breath on your own and your chance of recovery was unlikely, what would be your preference about the use of a ventilator (breathing machine) if it was available to you? \"  The patient would desire the use of a ventilator. Resuscitation: \"In the event your heart stopped as a result of an underlying serious health condition, would you want attempts made to restart your heart, or would you prefer a natural death? \"  Yes, attempt to resuscitate.     treatment goals    Conversation Outcomes / Follow-Up Plan:  ACP complete - no further action today  Reviewed DNR/DNI and patient elects Full Code (Attempt Resuscitation)    Length of Voluntary ACP Conversation in minutes:  <16 minutes (Non-Billable)    Lazarus Forester, APRN - CNP

## 2023-09-18 NOTE — PROGRESS NOTES
Patient was seen and examined on HD at bedside. Orders were confirmed with the HD nurse. Tolerating the procedure well. Will run again in a.m. as per day 2, currently running for 2 hours as day 1. Status post Luis catheter placed in by IR today. Arjun Whitten MD  [facility]     This note is created with the assistance of a speech-recognition program. While intending to generate a document that actually reflects the content of the visit, no guarantees can be provided that every mistake has been identified and corrected by editing.

## 2023-09-18 NOTE — CONSULTS
Renal Progress Note    Patient :  Saad Shen; 80 y.o. MRN# [id number]  Location:  1829/0369-63  Attending:  Mary Menjivar MD  Admit Date:  9/17/2023   Hospital Day: 1    Subjective:     Patient was seen and examined. Overnight the nurse did report positive confusion. Currently patient is seems to be appropriate. Oriented to self and place. Patient's Ayaz Ann will also present at bedside today. Patient has elevated creatinine with minimal improvement since admission, creatinine 8.4 today with BUN of 64, GFR 4. Sodium 139, potassium 4.4, chloride 103, bicarb 20, calcium 8.6.  UA did not show any sign of infection. Urine culture blood culture results pending. C. difficile negative. Patient did refuse bladder scan per nurse. After discussion with the nurse and the patient, Camacho catheter was placed in and she made about 300 cc of urine. Patient was on normal saline at 75 cc an hour.   Outpatient Medications:     Medications Prior to Admission: apixaban (ELIQUIS) 2.5 MG TABS tablet, Take 1 tablet by mouth 2 times daily  amLODIPine (NORVASC) 5 MG tablet, Take 1 tablet by mouth daily  atenolol (TENORMIN) 100 MG tablet, Take 1 tablet by mouth daily  ezetimibe (ZETIA) 10 MG tablet, Take 1 tablet by mouth daily  levothyroxine (SYNTHROID) 100 MCG tablet, Take 1 tablet by mouth daily  pantoprazole (PROTONIX) 40 MG tablet, Take 1 tablet by mouth daily (Patient not taking: Reported on 9/17/2023)  aspirin 81 MG EC tablet, Take 81 mg by mouth daily (Patient not taking: Reported on 9/17/2023)    Current Medications:     Scheduled Meds:    sodium chloride flush  5-40 mL IntraVENous 2 times per day    sodium zirconium cyclosilicate  5 g Oral TID    insulin lispro  0-8 Units SubCUTAneous TID WC    insulin lispro  0-4 Units SubCUTAneous Nightly    levothyroxine  100 mcg Oral Daily    amLODIPine  10 mg Oral Daily     Continuous Infusions:    sodium chloride Stopped (09/18/23 0913)    sodium chloride      dextrose CREATININE 8.6* 8.5* 8.4*   GLUCOSE 97 102* 100*   CALCIUM 9.2 8.7 8.6      Magnesium:    Recent Labs     09/18/23  0812   MG 2.1     Albumin:    Recent Labs     09/18/23  0812   LABALBU 3.4*     SPEP:  Lab Results   Component Value Date/Time    PROT 6.1 09/18/2023 08:12 AM     Urinalysis/Chemistries:      Lab Results   Component Value Date/Time    NITRU NEGATIVE 09/17/2023 04:21 PM    COLORU Yellow 09/17/2023 04:21 PM    PHUR 8.0 09/17/2023 04:21 PM    WBCUA 0 TO 2 09/17/2023 04:21 PM    RBCUA 5 TO 10 09/17/2023 04:21 PM    BACTERIA FEW 09/17/2023 02:57 AM    SPECGRAV 1.009 09/17/2023 04:21 PM    LEUKOCYTESUR NEGATIVE 09/17/2023 04:21 PM    UROBILINOGEN Normal 09/17/2023 04:21 PM    BILIRUBINUR NEGATIVE 09/17/2023 04:21 PM    GLUCOSEU NEGATIVE 09/17/2023 04:21 PM    KETUA NEGATIVE 09/17/2023 04:21 PM     Urine Sodium:     Lab Results   Component Value Date/Time    LISETTE 84 09/17/2023 04:21 PM     Radiology:     Reviewed. Assessment:     Acute Kidney Injury likely prerenal due to severe dehydration and further bleeding to ATN, baseline creatinine seems to normal, creatinine currently quite elevated and did not seem to respond to IV fluids possibly worsened with obstructive uropathy. Patient has been having some reported confusion overnight, did have hyperkalemia with electrolyte imbalance as well. Hyperkalemia-currently stable with medical management. Hypertension. Bradycardia. History of PE on Eliquis. Confusion/possible uremia. Urinary retention. Azotemia    Plan:   Okay to stop IV fluids for now. Will get a Camacho catheter placed in for strict I's and O's and due to urine retention obstructive apathy. Recommend getting urology on board as well. I did discuss in detail with the patient and her [address] about her current condition, code status was also discussed, she does not want any intubation or CPR. But she does agree with dialysis. 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   Dialysis related risks
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
## 2023-09-18 NOTE — BRIEF OP NOTE
Brief Postoperative Note Non Tunneled HD Catheter    Shantelle Cohen  YOB: 1934  [id number]    Pre-operative Diagnosis: Acute Renal Failure      Post-operative Diagnosis: Same    Procedure: Non tunneled Dialysis Catheter    Anesthesia: 1%Lidocaine     Surgeons/Assistants: Marvetta Brunner RedFox, PA-C    Estimated Blood Loss: Minimal    Complications: none    14 Fr x 15 cm non tunneled HD Catheter placed via Site:  Right External Jugular Vein. Sutured in place and sterile dressing applied. Locked with Heparin  May use HD cath for dialysis.     Electronically signed by ELLIS Duong on 9/18/2023 at 4:47 PM

## 2023-09-18 NOTE — CONSULTS
Palliative Care Inpatient Consult    NAME:  Marina Lopez  MEDICAL RECORD NUMBER:  [id number]  AGE: 80 y.o. GENDER: female  : 1934  TODAY'S DATE:  2023    Reasons for Consultation:    Symptom and/or pain management  Provision of information regarding PC and/or hospice philosophies  Complex, time-intensive communication and interdisciplinary psychosocial support  Clarification of goals of care and/or assistance with difficult decision-making  Guidance in regards to resources and transition(s)    Members of PC team contributing to this consultation are : Inna Jaramillo, Palliative Care APRN-CNP    Plan      Palliative Interaction: Patient seen and examined at bedside. Patient resting comfortably upon my arrival. I introduced myself and my palliative role in her care. Patient endorses feeling tired, stating she has not been able to sleep since coming into the hospital. 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 She lives alone, and has since her  passed away many years ago. She states they did not have any children and that her brothers and sisters live in [place]. Patient has completed [facility] paperwork naming her neighbor Kathlean Severe, copy on file in patients chart. I explain one of my goals is to understand the patients wishes in terms of medical care. Patient states \"I don't want to die\". We discuss resuscitative measures, patient states she wishes to receive CPR and mechanical ventilation should it be necessary. I inquire about dialysis, and patient is agreeable to this as well. Patient to remain a full code at this time. Palliative care will continue to follow.      Education/support to family  Education/support to patient  Discharge planning/helping to coordinate care  Communications with primary service  Clarification of medical condition to patient and family  Code status clarified: Full Code  Principle Problem/Diagnosis:  Acute kidney injury ([address])    Additional Assessments: deficits   Skin: Normal turgor, no bleeding, no bruising     Palliative Performance Scale:  __x_60%  Ambulation reduced; Significant disease; Can't do hobbies/housework; intake normal or reduced; occasional assist; LOC full/confusion  ___50%  Mainly sit/lie; Extensive disease; Can't do any work; Considerable assist; intake normal or reduced; LOC full/confusion  ___40%  Mainly in bed; Extensive disease; Mainly assist; intake normal or reduced; LOC full/confusion   ___30%  Bed Bound; Extensive disease; Total care; intake reduced; LOCfull/confusion  ___20%  Bed Bound; Extensive disease; Total care; intake minimal; Drowsy/coma  ___10%  Bed Bound; Extensive disease; Total care; Mouth care only; Drowsy/coma  ___0       Death        Thank you for allowing Palliative Care to participate in the care of Ms. Marciano Dumont . This note has been dictated by dragon, typing errors may be a possibility. The total time I spent in seeing the patient, discussing goals of care, advanced directives, code status and other major issues was more than 60 minutes      Electronically signed by   AARON Pastrana CNP  Palliative Care Team  on 9/18/2023 at 1:00 PM    Palliative Care can be reached via perfect serve.

## 2023-09-18 NOTE — PLAN OF CARE
Received a text from patient's nurse that patient wishes are to be resuscitated in case if her heart stopped. I went to see the patient and discuss CODE STATUS. She said that she would like to have everything done in case if her heart stopped. She is okay with chest compressions, intubation and medication. Changing patient CODE STATUS to full code. Patient also mention her friend Cuba Murray who is her neighbor and she said that in case if she could not make her decision she would want her to make decisions for her. But there is no official paperwork signed, unclear if family is aware of that. Patient family lives in [place]. Need to be discussed further in the morning to assign a power of . Later again received a text from patient nurse that patient appears to be anxious and she is saying that she is going to die. And also nurse noticed some pink-tinged sputum. Checking H&H, if there is a drop in hemoglobin or if there is episode of blood in sputum, will stop heparin. Since patient did not take Norvasc, continue on hydralazine as needed for blood pressure control.

## 2023-09-18 NOTE — PROGRESS NOTES
RN attempted to give patient scheduled Norvasc. As soon as the pill touched her toungue patient began coughing up pink-tinged sputum saying she feels sick to her stomach. Patient states \"I'm going to die\" and \"just let me go home, I don't want to be here I want to go home\". Dr. Marina Gardner notified of above. H&H ordered, keep heparin gtt for now, give prn hydralazine early.

## 2023-09-18 NOTE — PROGRESS NOTES
Per Dr. Kalyani Meyers Resume heparin gtt tomorrow morning if stable. Night shift nurse aware.     Carrie Parrish RN

## 2023-09-18 NOTE — PLAN OF CARE
PCA notified us that the isabel was out  Spoke to Enrique Coronel, asked to put new isabel in  Will follow orders and continue to monitor  Problem: Discharge Planning  Goal: Discharge to home or other facility with appropriate resources  Outcome: Progressing     Problem: Safety - Adult  Goal: Free from fall injury  Outcome: Progressing  Flowsheets (Taken 9/18/2023 0800)  Free From Fall Injury: Instruct family/caregiver on patient safety     Problem: ABCDS Injury Assessment  Goal: Absence of physical injury  Outcome: Progressing  Flowsheets (Taken 9/18/2023 0800)  Absence of Physical Injury: Implement safety measures based on patient assessment     Problem: Skin/Tissue Integrity  Goal: Absence of new skin breakdown  Description: 1. Monitor for areas of redness and/or skin breakdown  2. Assess vascular access sites hourly  3. Every 4-6 hours minimum:  Change oxygen saturation probe site  4. Every 4-6 hours:  If on nasal continuous positive airway pressure, respiratory therapy assess nares and determine need for appliance change or resting period.   Outcome: Progressing

## 2023-09-18 NOTE — PROGRESS NOTES
1017 PTT >180   Writer stopped heparin gtt per protocol.     Per Dr. Barriga Session Hold heparin gtt for now until I.R patient is reviewed by Mei Villanueva RN

## 2023-09-18 NOTE — CARE COORDINATION
Spoke to patient at bedside along with Wendy in [address] [facility]. We discussed her home life and if she feels safe returning. She tells us that she knows that she will need to have some typ of assistance when returning home. I explained that any type of care I can get in the home will not be 24 hour care, asked if she would be agreeable to SNF. She states I will do what I have to.

## 2023-09-18 NOTE — PROGRESS NOTES
Dialysis Time Out  To be done by RN and tech or 2 RNs  Staff Names Steph Paul RN and Joanna Dumont RN    [x]  Identity of the patient using 2 patient identifiers  [x]  Consent for treatment  [x]  Equipment-proper machine and dialyzer  [x]  B-Hep B status  [x]  Orders- to include bath, blood flow, dialyzer, time and fluid removal  [x]  Access-Correct site and in working order  [x]  Time for patient to ask questions.

## 2023-09-18 NOTE — PROGRESS NOTES
Patient bp remains elevated 197/68. PRN hydralazine not available until 2100. Per [facility] NP, okay to give PRN hydralazine early.

## 2023-09-19 ENCOUNTER — APPOINTMENT (OUTPATIENT)
Age: 88
DRG: 840 | End: 2023-09-19
Attending: INTERNAL MEDICINE
Payer: MEDICARE

## 2023-09-19 ENCOUNTER — ANESTHESIA EVENT (OUTPATIENT)
Dept: OPERATING ROOM | Age: 88
End: 2023-09-19
Payer: MEDICARE

## 2023-09-19 ENCOUNTER — APPOINTMENT (OUTPATIENT)
Dept: DIALYSIS | Age: 88
DRG: 840 | End: 2023-09-19
Attending: INTERNAL MEDICINE
Payer: MEDICARE

## 2023-09-19 PROBLEM — K80.50 CHOLEDOCHOLITHIASIS: Status: ACTIVE | Noted: 2023-09-19

## 2023-09-19 PROBLEM — C90.00 MULTIPLE MYELOMA NOT HAVING ACHIEVED REMISSION (HCC): Status: ACTIVE | Noted: 2023-09-19

## 2023-09-19 LAB
ANION GAP SERPL CALCULATED.3IONS-SCNC: 15 MMOL/L (ref 9–17)
BUN SERPL-MCNC: 41 MG/DL (ref 8–23)
CALCIUM SERPL-MCNC: 8.6 MG/DL (ref 8.6–10.4)
CHLORIDE SERPL-SCNC: 99 MMOL/L (ref 98–107)
CO2 SERPL-SCNC: 23 MMOL/L (ref 20–31)
CREAT SERPL-MCNC: 6.6 MG/DL (ref 0.5–0.9)
ERYTHROCYTE [DISTWIDTH] IN BLOOD BY AUTOMATED COUNT: 13.9 % (ref 11.8–14.4)
GFR SERPL CREATININE-BSD FRML MDRD: 6 ML/MIN/1.73M2
GLUCOSE BLD-MCNC: 106 MG/DL (ref 65–105)
GLUCOSE BLD-MCNC: 121 MG/DL (ref 65–105)
GLUCOSE BLD-MCNC: 150 MG/DL (ref 65–105)
GLUCOSE SERPL-MCNC: 108 MG/DL (ref 70–99)
HCT VFR BLD AUTO: 36.4 % (ref 36.3–47.1)
HGB BLD-MCNC: 11.4 G/DL (ref 11.9–15.1)
MCH RBC QN AUTO: 33.7 PG (ref 25.2–33.5)
MCHC RBC AUTO-ENTMCNC: 31.3 G/DL (ref 28.4–34.8)
MCV RBC AUTO: 107.7 FL (ref 82.6–102.9)
NRBC BLD-RTO: 0 PER 100 WBC
PARTIAL THROMBOPLASTIN TIME: 28.8 SEC (ref 23–36.5)
PARTIAL THROMBOPLASTIN TIME: 29.2 SEC (ref 23–36.5)
PARTIAL THROMBOPLASTIN TIME: 29.4 SEC (ref 23–36.5)
PLATELET # BLD AUTO: 209 K/UL (ref 138–453)
PMV BLD AUTO: 11.3 FL (ref 8.1–13.5)
POTASSIUM SERPL-SCNC: 4.4 MMOL/L (ref 3.7–5.3)
RBC # BLD AUTO: 3.38 M/UL (ref 3.95–5.11)
SODIUM SERPL-SCNC: 137 MMOL/L (ref 135–144)
WBC OTHER # BLD: 8.6 K/UL (ref 3.5–11.3)

## 2023-09-19 PROCEDURE — 90935 HEMODIALYSIS ONE EVALUATION: CPT | Performed by: INTERNAL MEDICINE

## 2023-09-19 PROCEDURE — 80048 BASIC METABOLIC PNL TOTAL CA: CPT

## 2023-09-19 PROCEDURE — 99232 SBSQ HOSP IP/OBS MODERATE 35: CPT | Performed by: INTERNAL MEDICINE

## 2023-09-19 PROCEDURE — 6370000000 HC RX 637 (ALT 250 FOR IP): Performed by: INTERNAL MEDICINE

## 2023-09-19 PROCEDURE — 85027 COMPLETE CBC AUTOMATED: CPT

## 2023-09-19 PROCEDURE — 99223 1ST HOSP IP/OBS HIGH 75: CPT | Performed by: INTERNAL MEDICINE

## 2023-09-19 PROCEDURE — 85730 THROMBOPLASTIN TIME PARTIAL: CPT

## 2023-09-19 PROCEDURE — 82947 ASSAY GLUCOSE BLOOD QUANT: CPT

## 2023-09-19 PROCEDURE — 2060000000 HC ICU INTERMEDIATE R&B

## 2023-09-19 PROCEDURE — 2500000003 HC RX 250 WO HCPCS: Performed by: INTERNAL MEDICINE

## 2023-09-19 PROCEDURE — 36415 COLL VENOUS BLD VENIPUNCTURE: CPT

## 2023-09-19 PROCEDURE — 2580000003 HC RX 258: Performed by: NURSE PRACTITIONER

## 2023-09-19 PROCEDURE — 90935 HEMODIALYSIS ONE EVALUATION: CPT

## 2023-09-19 RX ORDER — HEPARIN SODIUM 10000 [USP'U]/100ML
5-30 INJECTION, SOLUTION INTRAVENOUS CONTINUOUS
Status: CANCELLED | OUTPATIENT
Start: 2023-09-19

## 2023-09-19 RX ADMIN — DOXYCYCLINE HYCLATE 100 MG: 100 TABLET ORAL at 20:41

## 2023-09-19 RX ADMIN — DOXYCYCLINE HYCLATE 100 MG: 100 TABLET ORAL at 08:14

## 2023-09-19 RX ADMIN — SODIUM CHLORIDE, PRESERVATIVE FREE 10 ML: 5 INJECTION INTRAVENOUS at 08:17

## 2023-09-19 RX ADMIN — SODIUM CHLORIDE, PRESERVATIVE FREE 10 ML: 5 INJECTION INTRAVENOUS at 20:42

## 2023-09-19 RX ADMIN — LEVOTHYROXINE SODIUM 100 MCG: 100 TABLET ORAL at 08:14

## 2023-09-19 RX ADMIN — AMLODIPINE BESYLATE 10 MG: 10 TABLET ORAL at 08:14

## 2023-09-19 RX ADMIN — ANTI-FUNGAL POWDER MICONAZOLE NITRATE TALC FREE: 1.42 POWDER TOPICAL at 21:45

## 2023-09-19 RX ADMIN — Medication 1 CAPSULE: at 08:14

## 2023-09-19 RX ADMIN — ANTI-FUNGAL POWDER MICONAZOLE NITRATE TALC FREE: 1.42 POWDER TOPICAL at 08:15

## 2023-09-19 NOTE — PROGRESS NOTES
[facility]  Office: [phone]  Marco Antonio Dunlap Azeem, DO, Surya Noel MD, Carlie Frederick MD, Ubaldo Reina MD, Nakita Benitez MD,  Ted Bowen MD, Sarah Crisostomo MD, Eligio Hayes DO, Gurmeet Cohen MD,  Lalit Dias DO, Joana Sanchez MD, Maggi Woodruff MD, Emil Membreno DO, Bryan Tian MD,  Jodie Buckner DO, Ramone Salinas MD, Brittany Garcia MD, Ariella Resendiz MD, Tracie Ramírez MD,  Daylin Marina MD, Tapan Madrid MD, Elizabeth Mills MD, Eliana Meza MD, Kyleigh Marie DO, Fidel Mehta MD,  Marisa Barreto MD, Kira Santos MD, Royce Dumas, CNP,  Shravan Colvin, CNP,, Pam Mendoza, CNP,  Kenenth Forrest, DNP, Ruth Santos, CNP, Gian Coto, CNP, Jordan Strong, CNP, Cristina Paulino, CNP, Vinicio Schafer, CNP, Luis A Roth, CNP, Ebony Perkins, CNS, Mercer Fabry, CNP, Favian Gardner, 4 Keaton De Hao Tse    Progress Note    9/19/2023    2:37 PM    Name:   Kira Santos  MRN:     [id number]     Acct:      [de-identified]   Room:   [address] [place] [address] Day:  2  Admit Date:  9/17/2023 12:17 PM    PCP:   Luis Angel Lo MD  Code Status:  Full Code    Subjective:     C/C: fatigue, DARIUSZ    Interval History Status: not changed. No issues overnight  States fatigue improved  Seen post HD  No other complaints  Denies abd pain     Brief History: This is 80year old female who was transferred to us from [facility].  Patient presented to the ER with 2 weeks history of weakness and lethargy. Patient was found to be in acute kidney injury with creatinine of more than 8 and potassium of more than 6. She was transferred over here for further evaluation. Patient says that she has not been feeling well for the last 2 weeks. She just did not have an appetite. She was not eating and drinking well. Denies chest pain. Denies nausea or vomiting. upstream dilatation. 2.  Cholelithiasis without CT evidence for acute cholecystitis or pancreatitis. 3.  Subtle asymmetric left perinephric stranding. No hydronephrosis. Clinical correlation is recommended as to the possibility of underlying urinary tract infection. 4.  Small pleural effusions. 5.  Diverticulosis. US RENAL LIMITED    Result Date: 9/18/2023  No hydronephrosis. Mildly echogenic kidneys suggestive of intrinsic renal parenchymal disease. XR CHEST PORTABLE    Result Date: 9/17/2023  1. Pulmonary vascular congestion and mild to moderate cardiomegaly. Minimal patchy airspace opacities in the mid to basilar lungs could represent edema, pneumonia, or scarring. 2. Widening of the bilateral paratracheal stripes most likely due to mediastinal fat, vessels, or intrathoracic goiter. However, lymphadenopathy or an anterior mediastinal mass could result in a similar appearance.        Physical Examination:        General appearance:  alert, cooperative and no distress  Mental Status:  oriented to person, place and time and normal affect  Lungs:  clear to auscultation bilaterally, normal effort  Heart:  regular rate and rhythm  Abdomen:  soft, nontender, nondistended, normal bowel sounds  Extremities:  no edema, redness, tenderness in the calves  Skin:  no gross lesions, rashes, induration    Assessment:        Hospital Problems             Last Modified POA    * (Principal) Acute kidney injury ([address]) 9/17/2023 Yes    DARIUSZ (acute kidney injury) ([address]) 9/17/2023 Yes    Hypertension, essential 9/18/2023 Yes    Duodenal ulcer 9/17/2023 Yes    Hyperlipidemia 9/17/2023 Yes    Hypothyroidism 9/17/2023 Yes    Pulmonary embolism ([address]) 9/17/2023 Yes    Edema 9/17/2023 Yes    Hyperkalemia 9/18/2023 Yes    Confusion 9/18/2023 Yes    Goals of care, counseling/discussion 9/18/2023 Yes    Encounter for palliative care 9/18/2023 Yes       Plan:        Acute kidney injury - due to dehydration   - Nephrology following - HD

## 2023-09-19 NOTE — PROGRESS NOTES
Per Dr. Keyanna Richard and Day shift RN, restart heparin gtt in am if stable. APTT orders in for 0600, lab made aware to obtain APTT with morning labs.

## 2023-09-19 NOTE — PLAN OF CARE
Problem: Discharge Planning  Goal: Discharge to home or other facility with appropriate resources  Outcome: Progressing     Problem: Safety - Adult  Goal: Free from fall injury  Outcome: Progressing     Problem: ABCDS Injury Assessment  Goal: Absence of physical injury  Outcome: Progressing     Problem: Skin/Tissue Integrity  Goal: Absence of new skin breakdown  Description: 1. Monitor for areas of redness and/or skin breakdown  2. Assess vascular access sites hourly  3. Every 4-6 hours minimum:  Change oxygen saturation probe site  4. Every 4-6 hours:  If on nasal continuous positive airway pressure, respiratory therapy assess nares and determine need for appliance change or resting period.   Outcome: Progressing     Problem: Neurosensory - Adult  Goal: Achieves stable or improved neurological status  Outcome: Progressing  Goal: Achieves maximal functionality and self care  Outcome: Progressing     Problem: Respiratory - Adult  Goal: Achieves optimal ventilation and oxygenation  Outcome: Progressing     Problem: Cardiovascular - Adult  Goal: Maintains optimal cardiac output and hemodynamic stability  Outcome: Progressing  Goal: Absence of cardiac dysrhythmias or at baseline  Outcome: Progressing     Problem: Skin/Tissue Integrity - Adult  Goal: Skin integrity remains intact  Outcome: Progressing     Problem: Musculoskeletal - Adult  Goal: Return mobility to safest level of function  Outcome: Progressing  Goal: Return ADL status to a safe level of function  Outcome: Progressing     Problem: Genitourinary - Adult  Goal: Absence of urinary retention  Outcome: Progressing  Goal: Urinary catheter remains patent  Outcome: Progressing     Problem: Metabolic/Fluid and Electrolytes - Adult  Goal: Electrolytes maintained within normal limits  Outcome: Progressing

## 2023-09-19 NOTE — CONSULTS
NPO from midnight  2. Hold heparin at 6 AM tomorrow. 3.  Plan for ERCP tomorrow. This plan was formulated in collaboration with Dr. Delfin Osei .  Thank you for allowing us to participate in the care of your patient.     Efe Shah MD  Internal Medicine Resident, PGY-1  [address], [place]  8/58/3078,81:28 AM

## 2023-09-19 NOTE — CARE COORDINATION
Hemodialysis Initial Assessment    Information provided by: Patient    New or Current with HD: New, DARIUSZ    Unit: Referral to [facility]     Schedule: TBD    Physician:  [facility]    Transportation: Self     Insurance:  Medicare/[employer]    DME: [employer]     -Met with patient to discuss potential need for OP HD. Explained referral process. Patient confirmed preferences listed above. Referral submitted to [facility].

## 2023-09-19 NOTE — PROGRESS NOTES
Dialysis Time Out  To be done by RN and tech or 2 RNs  Staff Names Mahnaz Parker RN and [facility] RN    [x]  Identity of the patient using 2 patient identifiers  [x]  Consent for treatment  [x]  Equipment-proper machine and dialyzer  [x]  B-Hep B status  [x]  Orders- to include bath, blood flow, dialyzer, time and fluid removal  [x]  Access-Correct site and in working order  [x]  Time for patient to ask questions.

## 2023-09-20 ENCOUNTER — APPOINTMENT (OUTPATIENT)
Dept: GENERAL RADIOLOGY | Age: 88
DRG: 840 | End: 2023-09-20
Attending: INTERNAL MEDICINE
Payer: MEDICARE

## 2023-09-20 ENCOUNTER — ANESTHESIA (OUTPATIENT)
Dept: OPERATING ROOM | Age: 88
End: 2023-09-20
Payer: MEDICARE

## 2023-09-20 ENCOUNTER — APPOINTMENT (OUTPATIENT)
Age: 88
DRG: 840 | End: 2023-09-20
Attending: INTERNAL MEDICINE
Payer: MEDICARE

## 2023-09-20 PROBLEM — Z99.2 DEPENDENCE ON RENAL DIALYSIS (HCC): Status: ACTIVE | Noted: 2023-09-20

## 2023-09-20 LAB
ANION GAP SERPL CALCULATED.3IONS-SCNC: 14 MMOL/L (ref 9–17)
B2 MICROGLOB SERPL IA-MCNC: 12.4 MG/L (ref 0.6–2.4)
BUN SERPL-MCNC: 33 MG/DL (ref 8–23)
CALCIUM SERPL-MCNC: 9.1 MG/DL (ref 8.6–10.4)
CHLORIDE SERPL-SCNC: 98 MMOL/L (ref 98–107)
CO2 SERPL-SCNC: 25 MMOL/L (ref 20–31)
CREAT SERPL-MCNC: 5.7 MG/DL (ref 0.5–0.9)
ERYTHROCYTE [DISTWIDTH] IN BLOOD BY AUTOMATED COUNT: 13.6 % (ref 11.8–14.4)
ERYTHROCYTE [SEDIMENTATION RATE] IN BLOOD BY PHOTOMETRIC METHOD: 35 MM/HR (ref 0–30)
GFR SERPL CREATININE-BSD FRML MDRD: 7 ML/MIN/1.73M2
GLUCOSE BLD-MCNC: 107 MG/DL (ref 65–105)
GLUCOSE BLD-MCNC: 153 MG/DL (ref 65–105)
GLUCOSE BLD-MCNC: 93 MG/DL (ref 65–105)
GLUCOSE SERPL-MCNC: 111 MG/DL (ref 70–99)
HCT VFR BLD AUTO: 36.4 % (ref 36.3–47.1)
HGB BLD-MCNC: 11.9 G/DL (ref 11.9–15.1)
IGA SERPL-MCNC: 82 MG/DL (ref 70–400)
IGG SERPL-MCNC: 519 MG/DL (ref 700–1600)
IGM SERPL-MCNC: <25 MG/DL (ref 40–230)
MCH RBC QN AUTO: 34.2 PG (ref 25.2–33.5)
MCHC RBC AUTO-ENTMCNC: 32.7 G/DL (ref 28.4–34.8)
MCV RBC AUTO: 104.6 FL (ref 82.6–102.9)
NRBC BLD-RTO: 0 PER 100 WBC
PARTIAL THROMBOPLASTIN TIME: 29.3 SEC (ref 23–36.5)
PARTIAL THROMBOPLASTIN TIME: 30.1 SEC (ref 23–36.5)
PLATELET # BLD AUTO: 236 K/UL (ref 138–453)
PMV BLD AUTO: 11 FL (ref 8.1–13.5)
POTASSIUM SERPL-SCNC: 4.3 MMOL/L (ref 3.7–5.3)
RBC # BLD AUTO: 3.48 M/UL (ref 3.95–5.11)
SODIUM SERPL-SCNC: 137 MMOL/L (ref 135–144)
WBC OTHER # BLD: 11.3 K/UL (ref 3.5–11.3)

## 2023-09-20 PROCEDURE — 7100000001 HC PACU RECOVERY - ADDTL 15 MIN: Performed by: INTERNAL MEDICINE

## 2023-09-20 PROCEDURE — 85730 THROMBOPLASTIN TIME PARTIAL: CPT

## 2023-09-20 PROCEDURE — 2580000003 HC RX 258: Performed by: NURSE PRACTITIONER

## 2023-09-20 PROCEDURE — 6370000000 HC RX 637 (ALT 250 FOR IP): Performed by: INTERNAL MEDICINE

## 2023-09-20 PROCEDURE — 7100000000 HC PACU RECOVERY - FIRST 15 MIN: Performed by: INTERNAL MEDICINE

## 2023-09-20 PROCEDURE — 6360000002 HC RX W HCPCS: Performed by: REGISTERED NURSE

## 2023-09-20 PROCEDURE — 99232 SBSQ HOSP IP/OBS MODERATE 35: CPT | Performed by: INTERNAL MEDICINE

## 2023-09-20 PROCEDURE — 3700000000 HC ANESTHESIA ATTENDED CARE: Performed by: INTERNAL MEDICINE

## 2023-09-20 PROCEDURE — 82784 ASSAY IGA/IGD/IGG/IGM EACH: CPT

## 2023-09-20 PROCEDURE — 2709999900 HC NON-CHARGEABLE SUPPLY: Performed by: INTERNAL MEDICINE

## 2023-09-20 PROCEDURE — 2580000003 HC RX 258: Performed by: REGISTERED NURSE

## 2023-09-20 PROCEDURE — 85652 RBC SED RATE AUTOMATED: CPT

## 2023-09-20 PROCEDURE — 3609015200 HC ERCP REMOVE CALCULI/DEBRIS BILIARY/PANCREAS DUCT: Performed by: INTERNAL MEDICINE

## 2023-09-20 PROCEDURE — 90935 HEMODIALYSIS ONE EVALUATION: CPT

## 2023-09-20 PROCEDURE — 3700000001 HC ADD 15 MINUTES (ANESTHESIA): Performed by: INTERNAL MEDICINE

## 2023-09-20 PROCEDURE — 6360000002 HC RX W HCPCS: Performed by: INTERNAL MEDICINE

## 2023-09-20 PROCEDURE — 0FC98ZZ EXTIRPATION OF MATTER FROM COMMON BILE DUCT, VIA NATURAL OR ARTIFICIAL OPENING ENDOSCOPIC: ICD-10-PCS | Performed by: INTERNAL MEDICINE

## 2023-09-20 PROCEDURE — 85027 COMPLETE CBC AUTOMATED: CPT

## 2023-09-20 PROCEDURE — 1200000000 HC SEMI PRIVATE

## 2023-09-20 PROCEDURE — 97166 OT EVAL MOD COMPLEX 45 MIN: CPT

## 2023-09-20 PROCEDURE — 2720000010 HC SURG SUPPLY STERILE: Performed by: INTERNAL MEDICINE

## 2023-09-20 PROCEDURE — BF131ZZ FLUOROSCOPY OF GALLBLADDER AND BILE DUCTS USING LOW OSMOLAR CONTRAST: ICD-10-PCS | Performed by: INTERNAL MEDICINE

## 2023-09-20 PROCEDURE — 82947 ASSAY GLUCOSE BLOOD QUANT: CPT

## 2023-09-20 PROCEDURE — C1769 GUIDE WIRE: HCPCS | Performed by: INTERNAL MEDICINE

## 2023-09-20 PROCEDURE — 97530 THERAPEUTIC ACTIVITIES: CPT

## 2023-09-20 PROCEDURE — 97535 SELF CARE MNGMENT TRAINING: CPT

## 2023-09-20 PROCEDURE — 36415 COLL VENOUS BLD VENIPUNCTURE: CPT

## 2023-09-20 PROCEDURE — 97162 PT EVAL MOD COMPLEX 30 MIN: CPT

## 2023-09-20 PROCEDURE — 2580000003 HC RX 258: Performed by: INTERNAL MEDICINE

## 2023-09-20 PROCEDURE — 6360000004 HC RX CONTRAST MEDICATION: Performed by: INTERNAL MEDICINE

## 2023-09-20 PROCEDURE — 80048 BASIC METABOLIC PNL TOTAL CA: CPT

## 2023-09-20 PROCEDURE — 82232 ASSAY OF BETA-2 PROTEIN: CPT

## 2023-09-20 PROCEDURE — 2500000003 HC RX 250 WO HCPCS: Performed by: REGISTERED NURSE

## 2023-09-20 RX ORDER — FENTANYL CITRATE 50 UG/ML
25 INJECTION, SOLUTION INTRAMUSCULAR; INTRAVENOUS EVERY 5 MIN PRN
Status: DISCONTINUED | OUTPATIENT
Start: 2023-09-20 | End: 2023-09-20 | Stop reason: HOSPADM

## 2023-09-20 RX ORDER — NEOSTIGMINE METHYLSULFATE 5 MG/5 ML
SYRINGE (ML) INTRAVENOUS PRN
Status: DISCONTINUED | OUTPATIENT
Start: 2023-09-20 | End: 2023-09-20 | Stop reason: SDUPTHER

## 2023-09-20 RX ORDER — GLYCOPYRROLATE 0.2 MG/ML
INJECTION INTRAMUSCULAR; INTRAVENOUS PRN
Status: DISCONTINUED | OUTPATIENT
Start: 2023-09-20 | End: 2023-09-20 | Stop reason: SDUPTHER

## 2023-09-20 RX ORDER — GLUCAGON 1 MG/ML
KIT INJECTION PRN
Status: DISCONTINUED | OUTPATIENT
Start: 2023-09-20 | End: 2023-09-20 | Stop reason: SDUPTHER

## 2023-09-20 RX ORDER — FENTANYL CITRATE 50 UG/ML
50 INJECTION, SOLUTION INTRAMUSCULAR; INTRAVENOUS EVERY 5 MIN PRN
Status: DISCONTINUED | OUTPATIENT
Start: 2023-09-20 | End: 2023-09-20 | Stop reason: HOSPADM

## 2023-09-20 RX ORDER — LIDOCAINE HYDROCHLORIDE 10 MG/ML
INJECTION, SOLUTION EPIDURAL; INFILTRATION; INTRACAUDAL; PERINEURAL PRN
Status: DISCONTINUED | OUTPATIENT
Start: 2023-09-20 | End: 2023-09-20 | Stop reason: SDUPTHER

## 2023-09-20 RX ORDER — HYDRALAZINE HYDROCHLORIDE 20 MG/ML
10 INJECTION INTRAMUSCULAR; INTRAVENOUS
Status: DISCONTINUED | OUTPATIENT
Start: 2023-09-20 | End: 2023-09-20 | Stop reason: HOSPADM

## 2023-09-20 RX ORDER — SODIUM CHLORIDE 9 MG/ML
INJECTION, SOLUTION INTRAVENOUS PRN
Status: DISCONTINUED | OUTPATIENT
Start: 2023-09-20 | End: 2023-09-20 | Stop reason: HOSPADM

## 2023-09-20 RX ORDER — DROPERIDOL 2.5 MG/ML
0.62 INJECTION, SOLUTION INTRAMUSCULAR; INTRAVENOUS
Status: DISCONTINUED | OUTPATIENT
Start: 2023-09-20 | End: 2023-09-20 | Stop reason: HOSPADM

## 2023-09-20 RX ORDER — SODIUM CHLORIDE 0.9 % (FLUSH) 0.9 %
5-40 SYRINGE (ML) INJECTION PRN
Status: DISCONTINUED | OUTPATIENT
Start: 2023-09-20 | End: 2023-09-20 | Stop reason: HOSPADM

## 2023-09-20 RX ORDER — PHENYLEPHRINE HCL IN 0.9% NACL 1 MG/10 ML
SYRINGE (ML) INTRAVENOUS PRN
Status: DISCONTINUED | OUTPATIENT
Start: 2023-09-20 | End: 2023-09-20 | Stop reason: SDUPTHER

## 2023-09-20 RX ORDER — PROPOFOL 10 MG/ML
INJECTION, EMULSION INTRAVENOUS PRN
Status: DISCONTINUED | OUTPATIENT
Start: 2023-09-20 | End: 2023-09-20 | Stop reason: SDUPTHER

## 2023-09-20 RX ORDER — DIPHENHYDRAMINE HYDROCHLORIDE 50 MG/ML
12.5 INJECTION INTRAMUSCULAR; INTRAVENOUS
Status: DISCONTINUED | OUTPATIENT
Start: 2023-09-20 | End: 2023-09-20 | Stop reason: HOSPADM

## 2023-09-20 RX ORDER — SODIUM CHLORIDE 0.9 % (FLUSH) 0.9 %
5-40 SYRINGE (ML) INJECTION EVERY 12 HOURS SCHEDULED
Status: DISCONTINUED | OUTPATIENT
Start: 2023-09-20 | End: 2023-09-20 | Stop reason: HOSPADM

## 2023-09-20 RX ORDER — ROCURONIUM BROMIDE 10 MG/ML
INJECTION, SOLUTION INTRAVENOUS PRN
Status: DISCONTINUED | OUTPATIENT
Start: 2023-09-20 | End: 2023-09-20 | Stop reason: SDUPTHER

## 2023-09-20 RX ORDER — SODIUM CHLORIDE, SODIUM LACTATE, POTASSIUM CHLORIDE, CALCIUM CHLORIDE 600; 310; 30; 20 MG/100ML; MG/100ML; MG/100ML; MG/100ML
INJECTION, SOLUTION INTRAVENOUS CONTINUOUS PRN
Status: DISCONTINUED | OUTPATIENT
Start: 2023-09-20 | End: 2023-09-20 | Stop reason: SDUPTHER

## 2023-09-20 RX ORDER — METOCLOPRAMIDE HYDROCHLORIDE 5 MG/ML
10 INJECTION INTRAMUSCULAR; INTRAVENOUS
Status: DISCONTINUED | OUTPATIENT
Start: 2023-09-20 | End: 2023-09-20 | Stop reason: HOSPADM

## 2023-09-20 RX ADMIN — Medication 100 MCG: at 11:31

## 2023-09-20 RX ADMIN — SODIUM CHLORIDE, PRESERVATIVE FREE 10 ML: 5 INJECTION INTRAVENOUS at 22:12

## 2023-09-20 RX ADMIN — DOXYCYCLINE HYCLATE 100 MG: 100 TABLET ORAL at 20:09

## 2023-09-20 RX ADMIN — ACETAMINOPHEN 650 MG: 650 SUPPOSITORY RECTAL at 23:24

## 2023-09-20 RX ADMIN — PROPOFOL 150 MG: 10 INJECTION, EMULSION INTRAVENOUS at 11:12

## 2023-09-20 RX ADMIN — GLUCAGON 0.5 MG: KIT at 11:36

## 2023-09-20 RX ADMIN — HEPARIN SODIUM 1200 UNITS: 1000 INJECTION INTRAVENOUS; SUBCUTANEOUS at 18:40

## 2023-09-20 RX ADMIN — DOXYCYCLINE HYCLATE 100 MG: 100 TABLET ORAL at 08:31

## 2023-09-20 RX ADMIN — GLUCAGON 0.5 MG: KIT at 11:32

## 2023-09-20 RX ADMIN — HYDRALAZINE HYDROCHLORIDE 10 MG: 20 INJECTION INTRAMUSCULAR; INTRAVENOUS at 00:37

## 2023-09-20 RX ADMIN — Medication 3 MG: at 11:39

## 2023-09-20 RX ADMIN — LIDOCAINE HYDROCHLORIDE 50 MG: 10 INJECTION, SOLUTION EPIDURAL; INFILTRATION; INTRACAUDAL; PERINEURAL at 11:12

## 2023-09-20 RX ADMIN — Medication 1 CAPSULE: at 08:31

## 2023-09-20 RX ADMIN — HEPARIN SODIUM 1000 UNITS: 1000 INJECTION INTRAVENOUS; SUBCUTANEOUS at 18:41

## 2023-09-20 RX ADMIN — LEVOTHYROXINE SODIUM 100 MCG: 100 TABLET ORAL at 08:31

## 2023-09-20 RX ADMIN — SODIUM CHLORIDE, POTASSIUM CHLORIDE, SODIUM LACTATE AND CALCIUM CHLORIDE: 600; 310; 30; 20 INJECTION, SOLUTION INTRAVENOUS at 10:57

## 2023-09-20 RX ADMIN — GLYCOPYRROLATE 0.6 MG: 0.2 INJECTION INTRAMUSCULAR; INTRAVENOUS at 11:39

## 2023-09-20 RX ADMIN — SODIUM CHLORIDE, PRESERVATIVE FREE 10 ML: 5 INJECTION INTRAVENOUS at 08:34

## 2023-09-20 RX ADMIN — AMLODIPINE BESYLATE 10 MG: 10 TABLET ORAL at 08:31

## 2023-09-20 RX ADMIN — ROCURONIUM BROMIDE 50 MG: 10 INJECTION, SOLUTION INTRAVENOUS at 11:12

## 2023-09-20 ASSESSMENT — PAIN - FUNCTIONAL ASSESSMENT: PAIN_FUNCTIONAL_ASSESSMENT: NONE - DENIES PAIN

## 2023-09-20 NOTE — PROGRESS NOTES
Occupational Therapy  [facility]  Occupational Therapy Initial Assessment    Name: Lokesh Olson  : 1934  MRN: [id number]  Date of Service: 2023    Copied from chart:   Chief Complaint:      Fatigue, weakness      Discharge Recommendations:  Patient would benefit from continued therapy after discharge  OT Equipment Recommendations  Equipment Needed: Yes  Mobility Devices: ADL Assistive Devices  ADL Assistive Devices: Reacher;Long-handled Shoe Horn;Long-handled Sponge;Sock-Aid Hard       Patient Diagnosis(es): The encounter diagnosis was Edema, unspecified type. Past Medical History:  has a past medical history of Hx of blood clots, Hyperlipidemia, Hypertension, Pulmonary embolism ([address]), and Thyroid disease. Past Surgical History:  has a past surgical history that includes IR NONTUNNELED VASCULAR CATHETER > 5 YEARS (2023). Assessment   Performance deficits / Impairments: Decreased functional mobility ; Decreased ADL status; Decreased strength;Decreased safe awareness;Decreased cognition;Decreased endurance;Decreased balance;Decreased high-level IADLs  Assessment: Pt agreed to OT this date. Pt is greatly limited by decreased cognition which is impacting the pt's ability to safely and independently engage in daily occupations. Pt completed bed mobility with Min-CGA for trunk progression with cues provided throughout for initiation and sequencing. Pt completed functional transfers and functional mobility with use of RW and CGA d/t unsteadiness and frequent cues required for safety and directional changes. Pt completed UB and LB dressing activities while seated requiring increased assistance d/t functional reach deficits and cognitive impairments limiting pt's ability to sequence activity.  Continued OT services are required to address deficits listed through use of skilled OT intervention to promote independence and safety with ADLs/IADLs and functional tranfsers/mobility. Prognosis: Good  Decision Making: Medium Complexity  REQUIRES OT FOLLOW-UP: Yes  Activity Tolerance  Activity Tolerance: Patient Tolerated treatment well;Treatment limited secondary to decreased cognition        Plan   Occupational Therapy Plan  Times Per Week: 3-5 x/wk  Current Treatment Recommendations: Strengthening, Balance training, Functional mobility training, Endurance training, Cognitive reorientation, Safety education & training, Patient/Caregiver education & training, Equipment evaluation, education, & procurement, Self-Care / ADL, Home management training     Restrictions  Restrictions/Precautions  Restrictions/Precautions: Up as Tolerated  Required Braces or Orthoses?: No    Subjective   General  Patient assessed for rehabilitation services?: Yes  Family / Caregiver Present: No  General Comment  Comments: RN ok'd pt for OT this date. Pt agreed to session and was pleasant/cooperative yet confused throughout. Pt denies pain    Social/Functional History  Social/Functional History  Lives With: Alone  Type of Home: House  Home Layout: One level  Home Access: Ramped entrance  Bathroom Shower/Tub: Walk-in shower  Bathroom Toilet: Handicap height  Bathroom Equipment: Shower chair, Grab bars in shower, Grab bars around toilet  Home Equipment: Walker, rolling (pt reports using RW for functional mobility for community distances, furniture walks within the home)  ADL Assistance: Independent  Homemaking Assistance: Independent  Homemaking Responsibilities: Yes  Ambulation Assistance: Independent  Transfer Assistance: Independent  Active : Yes  Mode of Transportation: Car  Occupation: Retired  Type of Occupation: retired burn RN  Leisure & Hobbies: spend time with boyfriend  Additional Comments: Pt reports having supportive friends in the area however no 24/7 assistance is available.  Pt is a questionable historian secondary to cognitive and orientation impairments       Objective   Safety

## 2023-09-20 NOTE — CARE COORDINATION
Patient is to have an ERCP today. Nephro is planning on a perm cath. Dialysis site has not been set up at this time. Need PT/OT evaluations. Patient is agreeable to SNF, ARU and HC    Called on the following referrals  [facility] awaiting therapy notes and dialysis plans  [facility]- no beds available this week, resend closer to discharge. Dialysis arrangements need to be completed and paid for as they do not arrange or pay for that.     [facility] voicemail

## 2023-09-20 NOTE — PROGRESS NOTES
Dialysis Time Out  To be done by RN and tech or 2 RNs  Staff Names Joanna Rodgers RN     [x]  Identity of the patient using 2 patient identifiers  [x]  Consent for treatment  [x]  Equipment-proper machine and dialyzer  [x]  B-Hep B status  [x]  Orders- to include bath, blood flow, dialyzer, time and fluid removal  [x]  Access-Correct site and in working order  [x]  Time for patient to ask questions.

## 2023-09-20 NOTE — PROGRESS NOTES
_                         Today's Date: 9/20/2023  Patient Name: Griffin Pa  Date of admission: 9/17/2023 12:17 PM  Patient's age: 80 y.o., 1934  Admission Dx: Acute kidney injury ([address]) [N17.9]  DARIUSZ (acute kidney injury) ([address]) [N17.9]      Requesting Physician: Lana Woodruff MD    CHIEF COMPLAINT: Altered mental status. DARIUSZ. Dehydration. Consult for bone lytic lesions of the skull. SUBJECTIVE:  Patient was seen and examined. Patient is clinically stable. No new events. She had EGD today. She had multiple gallbladder stones. No significant findings. No active bleeding. No fever. BRIEF CASE HISTORY:    The patient is a 80 y.o.  female who is admitted to the hospital for further management of altered mental status and DARIUSZ and dehydration. The patient had 2 weeks history of increasing weakness and fatigue and decreased oral intake. She had altered mental status on admission but it has improved later on. She was found to have DARIUSZ and she had dialysis. Mentally she has improved significantly. Further work-up with imaging of the head CT scan showed lytic lesions in the frontal skull. No brain lesions were noted. Further lab work-up showed elevated kappa light chain immunoglobulin at 7168. We have been consulted for further management. Patient is awake and alert and she has no complaints at the present time. No bone aches. No fever. No active bleeding. Past Medical History:   has a past medical history of Hx of blood clots, Hyperlipidemia, Hypertension, Pulmonary embolism ([address]), and Thyroid disease. Past Surgical History:   has a past surgical history that includes IR NONTUNNELED VASCULAR CATHETER > 5 YEARS (9/18/2023). Family History: family history is not on file. Social History:   reports that she has never smoked. She has never used smokeless tobacco. She reports that she does not currently use alcohol.  She and pelvic viscera is limited in the  absence of contrast.  Multiple calcified stones are present in the mid to  distal common bile duct with mild upstream dilatation. The gallbladder is  partially distended containing calcified stones but without wall thickening. The liver, pancreas, spleen, and adrenals reveal no acute findings. Mild  asymmetric left perinephric stranding. No hydronephrosis. Exophytic simple  left renal cyst for which no further evaluation is necessary. GI/Bowel: There is no bowel dilatation or wall thickening identified. Diverticulosis. Pelvis: No acute findings. The bladder is contracted with a Camacho catheter. Peritoneum/Retroperitoneum: No free air or free fluid. The aorta is normal  in caliber. No lymphadenopathy. Bones/Soft Tissues: Osteopenia. No acute findings. Partially visualized left  hip arthroplasty. Advanced arthropathy in the right hip. *Unless otherwise specified, incidental findings do not require dedicated  imaging follow-up. Impression: 1. Choledocholithiasis with multiple stones in the mid to distal common bile  duct and mild upstream dilatation. 2.  Cholelithiasis without CT evidence for acute cholecystitis or  pancreatitis. 3.  Subtle asymmetric left perinephric stranding. No hydronephrosis. Clinical correlation is recommended as to the possibility of underlying  urinary tract infection. 4.  Small pleural effusions. 5.  Diverticulosis. US RENAL LIMITED  Narrative: EXAMINATION:  ULTRASOUND OF THE KIDNEYS    9/18/2023 4:02 pm    COMPARISON:  None. HISTORY:  ORDERING SYSTEM PROVIDED HISTORY: DARIUSZ. TECHNOLOGIST PROVIDED HISTORY:  DARIUSZ. FINDINGS:  The right kidney measures 10.8 cm in length and the left kidney measures 12.8  cm in length. Mildly increased renal cortical echogenicity. No hydronephrosis. Exophytic  5.3 cm simple cyst arising from the left kidney. Impression: No hydronephrosis.   Mildly echogenic kidneys suggestive of intrinsic renal  parenchymal disease. IMPRESSION:    Primary Problem  Acute kidney injury [facility])    Active Hospital Problems    Diagnosis Date Noted    Choledocholithiasis [K80.50] 09/19/2023    Multiple myeloma not having achieved remission ([address]) [C90.00] 09/19/2023    Hyperkalemia [E87.5] 09/18/2023    Confusion [R41.0] 09/18/2023    Goals of care, counseling/discussion [Z71.89] 09/18/2023    Encounter for palliative care [Z51.5] 09/18/2023    Acute kidney injury ([address]) [N17.9] 09/17/2023    DARIUSZ (acute kidney injury) ([address]) [N17.9] 09/17/2023    Edema [R60.9] 09/17/2023    Duodenal ulcer [K26.9] 02/07/2018    Pulmonary embolism ([address]) [I26.99] 12/01/2017    Hypothyroidism [E03.9] 05/30/2013    Hypertension, essential [I10] 05/04/2012    Hyperlipidemia [E78.5] 05/04/2012     Multiple myeloma. Kappa light chain or IgG kappa multiple myeloma. 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 Renal insufficiency, at least in part related to multiple myeloma. RECOMMENDATIONS:  Records and labs and images were reviewed and discussed with the patient. Findings with CT scan showing lytic bone lesions in the skull in addition to the very high kappa light chain immunoglobulin above 7000 are consistent with multiple myeloma. Patient's renal insufficiency is very likely to be related to the same underlying cause. We will check the immunoglobulin panel and beta-2 microglobulin. We will check immunofixation. I explained to the patient the nature of multiple myeloma and management plans. Explained the possible need for bone marrow testing as well. I briefly discussed treatment plans. At this point patient is not interested in pursuing any further testing or treatment. However she decided to think of her options and will have final decisions in a later time. We will follow.   Patient's questions were answered to the best of her satisfaction and she verbalized full understanding and

## 2023-09-20 NOTE — CARE COORDINATION
Called [facility], to check on status of ref for [employer]. He will check and call SW back.

## 2023-09-20 NOTE — PROGRESS NOTES
Renal Progress Note    Patient :  Kelsy Hernandez; 80 y.o. MRN# [id number]  Location:  [id number]  Attending:  Alexsander Rodriguez MD  Admit Date:  9/17/2023   Hospital Day: 3    Subjective:     Patient seen and examined HD. Tolerating the procedure well. No new issue reported overnight. BMP results from today reviewed sodium 137, potassium 4.3, chloride 98, bicarb 25, calcium 9.1, BUN 33, creatinine 5.7 mg/dl. Patient was running today as per day 3 and tolerating the procedure well. Patient currently has Luis catheter in place, to get tunnel catheter done by IR in AM.  Hemodialysis initiated on 9/18/2023. Urine output documented as 300 cc in the last 24 hours. 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   Outpatient Medications:     Medications Prior to Admission: apixaban (ELIQUIS) 2.5 MG TABS tablet, Take 1 tablet by mouth 2 times daily  amLODIPine (NORVASC) 5 MG tablet, Take 1 tablet by mouth daily  atenolol (TENORMIN) 100 MG tablet, Take 1 tablet by mouth daily  ezetimibe (ZETIA) 10 MG tablet, Take 1 tablet by mouth daily  levothyroxine (SYNTHROID) 100 MCG tablet, Take 1 tablet by mouth daily  pantoprazole (PROTONIX) 40 MG tablet, Take 1 tablet by mouth daily (Patient not taking: Reported on 9/17/2023)  aspirin 81 MG EC tablet, Take 81 mg by mouth daily (Patient not taking: Reported on 9/17/2023)    Current Medications:     Scheduled Meds:    miconazole   Topical BID    doxycycline hyclate  100 mg Oral 2 times per day    lactobacillus  1 capsule Oral Daily with breakfast    sodium chloride flush  5-40 mL IntraVENous 2 times per day    insulin lispro  0-8 Units SubCUTAneous TID WC    insulin lispro  0-4 Units SubCUTAneous Nightly    levothyroxine  100 mcg Oral Daily    amLODIPine  10 mg Oral Daily     Continuous Infusions:    dextrose      heparin (PORCINE) Infusion Stopped (09/18/23 1022)     PRN Meds:  heparin

## 2023-09-20 NOTE — CONSULTS
_                         Today's Date: 9/19/2023  Patient Name: Julian Hanson  Date of admission: 9/17/2023 12:17 PM  Patient's age: 80 y.o., 1934  Admission Dx: Acute kidney injury ([address]) [N17.9]  DARIUSZ (acute kidney injury) ([address]) [N17.9]      Requesting Physician: Charles Werner MD    CHIEF COMPLAINT: Altered mental status. DARIUSZ. Dehydration. Consult for bone lytic lesions of the skull. History Obtained From:  patient, electronic medical record    HISTORY OF PRESENT ILLNESS:      The patient is a 80 y.o.  female who is admitted to the hospital for further management of altered mental status and DARIUSZ and dehydration. The patient had 2 weeks history of increasing weakness and fatigue and decreased oral intake. She had altered mental status on admission but it has improved later on. She was found to have DARIUSZ and she had dialysis. Mentally she has improved significantly. Further work-up with imaging of the head CT scan showed lytic lesions in the frontal skull. No brain lesions were noted. Further lab work-up showed elevated kappa light chain immunoglobulin at 7168. We have been consulted for further management. Patient is awake and alert and she has no complaints at the present time. No bone aches. No fever. No active bleeding. Past Medical History:   has a past medical history of Hx of blood clots, Hyperlipidemia, Hypertension, Pulmonary embolism ([address]), and Thyroid disease. Past Surgical History:   has a past surgical history that includes IR NONTUNNELED VASCULAR CATHETER > 5 YEARS (9/18/2023). Family History: family history is not on file. Social History:   reports that she has never smoked. She has never used smokeless tobacco. She reports that she does not currently use alcohol. She reports that she does not use drugs. Medications:    Prior to Admission medications    Medication Sig Start Date End Date Taking?  Authorizing left kidney. Impression: No hydronephrosis. Mildly echogenic kidneys suggestive of intrinsic renal  parenchymal disease. IMPRESSION:    Primary Problem  Acute kidney injury [facility])    Active Hospital Problems    Diagnosis Date Noted    Choledocholithiasis [K80.50] 09/19/2023    Hyperkalemia [E87.5] 09/18/2023    Confusion [R41.0] 09/18/2023    Goals of care, counseling/discussion [Z71.89] 09/18/2023    Encounter for palliative care [Z51.5] 09/18/2023    Acute kidney injury ([address]) [N17.9] 09/17/2023    DARIUSZ (acute kidney injury) ([address]) [N17.9] 09/17/2023    Edema [R60.9] 09/17/2023    Duodenal ulcer [K26.9] 02/07/2018    Pulmonary embolism ([address]) [I26.99] 12/01/2017    Hypothyroidism [E03.9] 05/30/2013    Hypertension, essential [I10] 05/04/2012    Hyperlipidemia [E78.5] 05/04/2012     Multiple myeloma. Kappa light chain or IgG kappa multiple myeloma. 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 Renal insufficiency, at least in part related to multiple myeloma. RECOMMENDATIONS:  Records and labs and images were reviewed and discussed with the patient. Findings with CT scan showing lytic bone lesions in the skull in addition to the very high kappa light chain immunoglobulin above 7000 are consistent with multiple myeloma. Patient's renal insufficiency is very likely to be related to the same underlying cause. We will check the immunoglobulin panel and beta-2 microglobulin. We will check immunofixation. I explained to the patient the nature of multiple myeloma and management plans. Explained the possible need for bone marrow testing as well. I briefly discussed treatment plans. At this point patient is not interested in pursuing any further testing or treatment. However she decided to think of her options and will have final decisions in a later time. We will follow.   Patient's questions were answered to the best of her satisfaction and she verbalized full understanding and agreement. Thank you for allowing us to participate in the care of this pleasant patient. Morgan Stoddard MD, MD                            [facility]                            This note is created with the assistance of a speech recognition program.  While intending to generate a document that actually reflects the content of the visit, the document can still have some errors including those of syntax and sound a like substitutions which may escape proof reading. It such instances, actual meaning can be extrapolated by contextual diversion.

## 2023-09-20 NOTE — CARE COORDINATION
Advised pt's Lukasz Rothman, has been concerned about pt at home. Oneita Deaner who shared concerns with pt's care at home by her 's cousin, Ervin Aguillon [place]). Eligio Olszewski stated pt has had memory issues Stated she was sick for a few weeks before he got her help. Stated he was not caring for her, she couldn't get out of the chair and left her at home. Ralston Olszewski stated she was the one that had to push her emergency response button to get her help. He was her DPOA but pt switched prior to her coming into the hosp. Ralston Olszewski concerned that pt cannot be at home alone any longer. Ralston Olszewski aware that pt is going to SNF and discussed she will need to work with the SW at the SNF for the next level of care. Ralston Olszewski feels she will need assisted living afterwards. Ralston Olszewski also concerned about pt giving [place] $50,000 for the [employer]. She has concerns that pt will have money for her future care. Explained that SW will make an APS ref d/t above concerns. Called [facility] - ref made.

## 2023-09-20 NOTE — PLAN OF CARE
Problem: Discharge Planning  Goal: Discharge to home or other facility with appropriate resources  9/20/2023 0030 by Karl Zambrano RN  Outcome: Progressing  9/19/2023 1938 by Liz Sarabia RN  Outcome: Progressing     Problem: Safety - Adult  Goal: Free from fall injury  9/20/2023 0030 by Karl Zambrano RN  Outcome: Progressing  9/19/2023 1938 by Liz Sarabia RN  Outcome: Progressing     Problem: ABCDS Injury Assessment  Goal: Absence of physical injury  9/20/2023 0030 by Karl Zambrano RN  Outcome: Progressing  9/19/2023 1938 by Liz Sarabia RN  Outcome: Progressing     Problem: Skin/Tissue Integrity  Goal: Absence of new skin breakdown  Description: 1. Monitor for areas of redness and/or skin breakdown  2. Assess vascular access sites hourly  3. Every 4-6 hours minimum:  Change oxygen saturation probe site  4. Every 4-6 hours:  If on nasal continuous positive airway pressure, respiratory therapy assess nares and determine need for appliance change or resting period.   9/20/2023 0030 by Karl Zambrano RN  Outcome: Progressing  9/19/2023 1938 by Liz Sarabia RN  Outcome: Progressing     Problem: Neurosensory - Adult  Goal: Achieves stable or improved neurological status  9/19/2023 1938 by Liz Sarabia RN  Outcome: Progressing  Goal: Achieves maximal functionality and self care  9/19/2023 1938 by Liz Sarabia RN  Outcome: Progressing     Problem: Respiratory - Adult  Goal: Achieves optimal ventilation and oxygenation  9/20/2023 0030 by Karl Zambrano RN  Outcome: Progressing  9/19/2023 1938 by Liz Sarabia RN  Outcome: Progressing     Problem: Cardiovascular - Adult  Goal: Maintains optimal cardiac output and hemodynamic stability  9/20/2023 0030 by Karl Zambrano RN  Outcome: Progressing  9/19/2023 1938 by Liz Sarabia RN  Outcome: Progressing  Goal: Absence of cardiac dysrhythmias or at baseline  9/20/2023 0030 by Ctaa Delgado RN  Outcome: Progressing  9/19/2023 1938 by Mary Virk RN  Outcome: Progressing     Problem: Skin/Tissue Integrity - Adult  Goal: Skin integrity remains intact  9/20/2023 0030 by Cata Delgado RN  Outcome: Progressing  9/19/2023 1938 by Mary Virk RN  Outcome: Progressing     Problem: Musculoskeletal - Adult  Goal: Return mobility to safest level of function  9/19/2023 1938 by Mary Virk RN  Outcome: Progressing  Goal: Return ADL status to a safe level of function  9/19/2023 1938 by Mary Virk RN  Outcome: Progressing     Problem: Genitourinary - Adult  Goal: Absence of urinary retention  9/19/2023 1938 by Mary Virk RN  Outcome: Progressing  Goal: Urinary catheter remains patent  9/20/2023 0030 by Cata Delgado RN  Outcome: Progressing  9/19/2023 1938 by Mary Virk RN  Outcome: Progressing     Problem: Metabolic/Fluid and Electrolytes - Adult  Goal: Electrolytes maintained within normal limits  9/20/2023 0030 by Cata Delgado RN  Outcome: Progressing  9/19/2023 1938 by Mary Virk RN  Outcome: Progressing

## 2023-09-20 NOTE — PLAN OF CARE
Problem: Discharge Planning  Goal: Discharge to home or other facility with appropriate resources  Outcome: Progressing     Problem: Safety - Adult  Goal: Free from fall injury  Outcome: Progressing     Problem: ABCDS Injury Assessment  Goal: Absence of physical injury  Outcome: Progressing     Problem: Skin/Tissue Integrity  Goal: Absence of new skin breakdown  Description: 1. Monitor for areas of redness and/or skin breakdown  2. Assess vascular access sites hourly  3. Every 4-6 hours minimum:  Change oxygen saturation probe site  4. Every 4-6 hours:  If on nasal continuous positive airway pressure, respiratory therapy assess nares and determine need for appliance change or resting period.   Outcome: Progressing     Problem: Neurosensory - Adult  Goal: Achieves stable or improved neurological status  Outcome: Progressing  Goal: Achieves maximal functionality and self care  Outcome: Progressing     Problem: Respiratory - Adult  Goal: Achieves optimal ventilation and oxygenation  Outcome: Progressing     Problem: Cardiovascular - Adult  Goal: Maintains optimal cardiac output and hemodynamic stability  Outcome: Progressing  Goal: Absence of cardiac dysrhythmias or at baseline  Outcome: Progressing     Problem: Skin/Tissue Integrity - Adult  Goal: Skin integrity remains intact  Outcome: Progressing     Problem: Musculoskeletal - Adult  Goal: Return mobility to safest level of function  Outcome: Progressing  Goal: Return ADL status to a safe level of function  Outcome: Progressing     Problem: Genitourinary - Adult  Goal: Absence of urinary retention  Outcome: Progressing  Goal: Urinary catheter remains patent  Outcome: Progressing     Problem: Metabolic/Fluid and Electrolytes - Adult  Goal: Electrolytes maintained within normal limits  Outcome: Progressing     Problem: Pain  Goal: Verbalizes/displays adequate comfort level or baseline comfort level  Outcome: Progressing

## 2023-09-20 NOTE — PROGRESS NOTES
Physical Therapy  Facility/Department: [facility]  Physical Therapy Initial Assessment    Name: Noa Milan  : 1934  MRN: [id number]  Date of Service: 2023  \"This is 80year old female who was transferred to us from [facility].  Patient presented to the ER with 2 weeks history of weakness and lethargy. Patient was found to be in acute kidney injury with creatinine of more than 8 and potassium of more than 6. She was transferred over here for further evaluation. Patient says that she has not been feeling well for the last 2 weeks. She just did not have an appetite. She was not eating and drinking well. Denies chest pain. Denies nausea or vomiting. She also denied diarrhea but the neighbor said that patient did have diarrhea for some time. Patient denies any fevers or coughing. Lately the patient was having decreased urine output and because of her condition she was encouraged to come to the hospital.\"  Discharge Recommendations:  Patient would benefit from continued therapy after discharge   PT Equipment Recommendations  Equipment Needed: No      Patient Diagnosis(es): The encounter diagnosis was Edema, unspecified type. Past Medical History:  has a past medical history of Hx of blood clots, Hyperlipidemia, Hypertension, Pulmonary embolism ([address]), and Thyroid disease. Past Surgical History:  has a past surgical history that includes IR NONTUNNELED VASCULAR CATHETER > 5 YEARS (2023). Assessment   Body Structures, Functions, Activity Limitations Requiring Skilled Therapeutic Intervention: Decreased functional mobility ; Decreased strength;Decreased balance; Increased pain;Decreased cognition;Decreased endurance  Assessment: Pt ambulated 15ft with RW CGA, pt requiring Rell to perform bed mobility, fatiguing quickly. Pt is a high fall risk due to confusion and cognition deficits and would be unsafe to perform functional mobility unassisted at this time.  Recommending Level tile  Device: Rolling Walker  Assistance: Contact guard assistance  Quality of Gait: Narrow HARISH; shuffling of gait  Gait Deviations: Slow Enma;Deviated path  Distance: 15ft  Comments: Pt fatigues quickly with progression of ambulation distance.   More Ambulation?: No  Stairs/Curb  Stairs?: No     Balance  Posture: Fair  Sitting - Static: Good;-  Sitting - Dynamic: Good;-  Standing - Static: Fair;+  Standing - Dynamic: Fair  Comments: standing balance assessed w/ RW; pt able to sit EOB independently  AM-PAC Score  AM-PAC Inpatient Mobility Raw Score : 19 (09/20/23 1524)  AM-PAC Inpatient T-Scale Score : 45.44 (09/20/23 1524)  Mobility Inpatient CMS 0-100% Score: 41.77 (09/20/23 1524)  Mobility Inpatient CMS G-Code Modifier : CK (09/20/23 1524)    Goals  Short Term Goals  Time Frame for Short Term Goals: 14  Short Term Goal 1: Pt to perform bed mobility independently  Short Term Goal 2: Pt to demonstrate functional transfers independently  Short Term Goal 3: Pt to ambulate 200ft w/ RW independently  Short Term Goal 4: Actively participate in 30 minutes of therapy to demo increased endurance       Education  Patient Education  Education Given To: Patient  Education Provided: Role of Therapy;Plan of Care  Education Method: Verbal  Barriers to Learning: Cognition  Education Outcome: Continued education needed      Therapy Time   Individual Concurrent Group Co-treatment   Time In 1348         Time Out 1429         Minutes 41         Timed Code Treatment Minutes: 9 Minutes       Buhmi Hughes PT Oral Minoxidil Pregnancy And Lactation Text: This medication should only be used when clearly needed if you are pregnant, attempting to become pregnant or breast feeding.

## 2023-09-20 NOTE — PROGRESS NOTES
asymmetric left perinephric stranding. No hydronephrosis. Clinical correlation is recommended as to the possibility of underlying urinary tract infection. 4.  Small pleural effusions. 5.  Diverticulosis. US RENAL LIMITED    Result Date: 9/18/2023  No hydronephrosis. Mildly echogenic kidneys suggestive of intrinsic renal parenchymal disease. XR CHEST PORTABLE    Result Date: 9/17/2023  1. Pulmonary vascular congestion and mild to moderate cardiomegaly. Minimal patchy airspace opacities in the mid to basilar lungs could represent edema, pneumonia, or scarring. 2. Widening of the bilateral paratracheal stripes most likely due to mediastinal fat, vessels, or intrathoracic goiter. However, lymphadenopathy or an anterior mediastinal mass could result in a similar appearance.        Physical Examination:        General appearance:  alert, cooperative and no distress  Mental Status:  oriented to person, place and time and normal affect  Lungs:  clear to auscultation bilaterally, normal effort  Heart:  regular rate and rhythm  Abdomen:  soft, nontender, nondistended, normal bowel sounds  Extremities:  no edema, redness, tenderness in the calves  Skin:  no gross lesions, rashes, induration    Assessment:        Hospital Problems             Last Modified POA    * (Principal) Acute kidney injury ([address]) 9/17/2023 Yes    DARIUSZ (acute kidney injury) ([address]) 9/17/2023 Yes    Hypertension, essential 9/18/2023 Yes    Duodenal ulcer 9/17/2023 Yes    Hyperlipidemia 9/17/2023 Yes    Hypothyroidism 9/17/2023 Yes    Pulmonary embolism ([address]) 9/17/2023 Yes    Edema 9/17/2023 Yes    Hyperkalemia 9/18/2023 Yes    Confusion 9/18/2023 Yes    Goals of care, counseling/discussion 9/18/2023 Yes    Encounter for palliative care 9/18/2023 Yes    Choledocholithiasis 9/19/2023 Yes    Multiple myeloma not having achieved remission ([address]) 9/19/2023 Yes     Plan:        Acute kidney injury - due to dehydration / multiple myeloma   - Nephrology following - HD today  - S/P HD catheter placement     Choledocholithiasis - GI consulted - ERCP today     Lytic skull lesions - Hematology consulted - likely multiple myeloma -check immunofixation - patient not interested in further testing/treatment     History of pulmonary embolism x2.  - home eliquis on hold - resume when no further intervention   - heparin bridge     Essential hypertension - continue Norvasc. Hold atenolol because of bradycardia. Norvasc dose increased     Hypothyroidism - continue levothyroxine    Metabolic encephalopathy - likely due to DARIUSZ - improved    Palliative following - full code    Disposition - further HD per nephrology.  DC when cleared by all services     Marzena Bonner MD  9/20/2023  1:50 PM

## 2023-09-20 NOTE — OP NOTE
ERCP      Patient:   Lokesh Olson   :    1934  Acc#:    [id number]   Referring/PCP: Clarice Luis MD  Facility:   [address] [place] [address]  Date:     2023   Endoscopist:  Ronal Zamora MD, [facility]    Procedure: ERCP with sphincterotomy and balloon extraction of multiple stones        Indication: Common bile duct stones on CT of the abdomen    Postprocedure diagnosis: Multiple CBD stones    Anesthesia:  General     EBL: None from the procedure    Specimen: None    Description of Procedure:  Prior to the procedure, a history and physical exam was performed and informed consent was obtained. The risks were discussed including pancreatitis, bleeding, and perforation. After the patient was placed in the prone position eved, the therapeutic duodenoscope scope was inserted into the mouth and advanced to the second portion of the duodenum allowing the papilla to be visualized. Using the a wire guided approach with the sphincterotome, the CBD was cannulated and a cholangiogram was performed. Findings: The initial cholangiogram revealed nondilated bile duct with multiple filling defects in the bile duct consistent with stones. CBD was 8 mm. A 8 mm sphincterotomy was performed. The sphincterotome was exchanged, over a wire for a 15 mm above injection balloon. Multiple balloon sweeps were performed revealing multiple black and yellow stones. A terminal balloon occlusion cholangiongram did not reveal any further filling defect. The duodenoscope was removed and the patient tolerated the procedure well. The pancreatic duct was not manipulated during the procedure. Plan: Low-fat diet.     Electronically signed by Ronal Zamora MD, FAC on 2023 at 11:44 AM

## 2023-09-21 ENCOUNTER — APPOINTMENT (OUTPATIENT)
Age: 88
DRG: 840 | End: 2023-09-21
Attending: INTERNAL MEDICINE
Payer: MEDICARE

## 2023-09-21 PROBLEM — D89.2 PARAPROTEINEMIA: Status: ACTIVE | Noted: 2023-09-21

## 2023-09-21 LAB
ALBUMIN PERCENT: 62 % (ref 45–65)
ALBUMIN SERPL-MCNC: 3.5 G/DL (ref 3.2–5.2)
ALPHA 2 PERCENT: 14 % (ref 6–13)
ALPHA1 GLOB SERPL ELPH-MCNC: 0.2 G/DL (ref 0.1–0.4)
ALPHA1 GLOB SERPL ELPH-MCNC: 4 % (ref 3–6)
ALPHA2 GLOB SERPL ELPH-MCNC: 0.8 G/DL (ref 0.5–0.9)
ANION GAP SERPL CALCULATED.3IONS-SCNC: 13 MMOL/L (ref 9–16)
B-GLOBULIN SERPL ELPH-MCNC: 0.7 G/DL (ref 0.5–1.1)
B-GLOBULIN SERPL ELPH-MCNC: 12 % (ref 11–19)
BUN SERPL-MCNC: 20 MG/DL (ref 8–23)
CALCIUM SERPL-MCNC: 8.7 MG/DL (ref 8.6–10.4)
CHLORIDE SERPL-SCNC: 97 MMOL/L (ref 98–107)
CO2 SERPL-SCNC: 27 MMOL/L (ref 20–31)
CREAT SERPL-MCNC: 4.2 MG/DL (ref 0.5–0.9)
ECHO AO ROOT DIAM: 3.2 CM
ECHO AO ROOT INDEX: 1.8 CM/M2
ECHO AV AREA PEAK VELOCITY: 3.3 CM2
ECHO AV AREA VTI: 3.6 CM2
ECHO AV AREA/BSA PEAK VELOCITY: 1.9 CM2/M2
ECHO AV AREA/BSA VTI: 2 CM2/M2
ECHO AV MEAN GRADIENT: 6 MMHG
ECHO AV MEAN VELOCITY: 1.1 M/S
ECHO AV PEAK GRADIENT: 12 MMHG
ECHO AV PEAK VELOCITY: 1.7 M/S
ECHO AV VELOCITY RATIO: 0.65
ECHO AV VTI: 40.1 CM
ECHO BSA: 1.86 M2
ECHO LA AREA 2C: 18.3 CM2
ECHO LA AREA 4C: 17.6 CM2
ECHO LA MAJOR AXIS: 6.2 CM
ECHO LA MINOR AXIS: 5.2 CM
ECHO LA VOL 2C: 50 ML (ref 22–52)
ECHO LA VOL 4C: 41 ML (ref 22–52)
ECHO LA VOL BP: 49 ML (ref 22–52)
ECHO LA VOL/BSA BIPLANE: 28 ML/M2 (ref 16–34)
ECHO LA VOLUME INDEX A2C: 28 ML/M2 (ref 16–34)
ECHO LA VOLUME INDEX A4C: 23 ML/M2 (ref 16–34)
ECHO LV E' LATERAL VELOCITY: 4 CM/S
ECHO LV E' SEPTAL VELOCITY: 4 CM/S
ECHO LV EDV A2C: 83 ML
ECHO LV EDV A4C: 89 ML
ECHO LV EDV INDEX A4C: 50 ML/M2
ECHO LV EDV NDEX A2C: 47 ML/M2
ECHO LV EJECTION FRACTION A2C: 58 %
ECHO LV EJECTION FRACTION A4C: 43 %
ECHO LV EJECTION FRACTION BIPLANE: 51 % (ref 55–100)
ECHO LV ESV A2C: 35 ML
ECHO LV ESV A4C: 50 ML
ECHO LV ESV INDEX A2C: 20 ML/M2
ECHO LV ESV INDEX A4C: 28 ML/M2
ECHO LVOT AREA: 5.3 CM2
ECHO LVOT AV VTI INDEX: 0.67
ECHO LVOT DIAM: 2.6 CM
ECHO LVOT MEAN GRADIENT: 2 MMHG
ECHO LVOT PEAK GRADIENT: 4 MMHG
ECHO LVOT PEAK VELOCITY: 1.1 M/S
ECHO LVOT STROKE VOLUME INDEX: 80.5 ML/M2
ECHO LVOT SV: 143.3 ML
ECHO LVOT VTI: 27 CM
ECHO MV A VELOCITY: 0.97 M/S
ECHO MV AREA VTI: 3.3 CM2
ECHO MV E DECELERATION TIME (DT): 256 MS
ECHO MV E VELOCITY: 0.65 M/S
ECHO MV E/A RATIO: 0.67
ECHO MV E/E' LATERAL: 16.25
ECHO MV E/E' RATIO (AVERAGED): 16.25
ECHO MV E/E' SEPTAL: 16.25
ECHO MV LVOT VTI INDEX: 1.6
ECHO MV MAX VELOCITY: 1.3 M/S
ECHO MV MEAN GRADIENT: 2 MMHG
ECHO MV MEAN VELOCITY: 0.7 M/S
ECHO MV PEAK GRADIENT: 7 MMHG
ECHO MV VTI: 43.1 CM
ECHO RV FREE WALL PEAK S': 13 CM/S
ECHO RV TAPSE: 2.1 CM (ref 1.7–?)
ERYTHROCYTE [DISTWIDTH] IN BLOOD BY AUTOMATED COUNT: 13.9 % (ref 11.8–14.4)
GAMMA GLOB SERPL ELPH-MCNC: 0.5 G/DL (ref 0.5–1.5)
GAMMA GLOBULIN %: 9 % (ref 9–20)
GFR SERPL CREATININE-BSD FRML MDRD: 10 ML/MIN/1.73M2
GLUCOSE BLD-MCNC: 118 MG/DL (ref 65–105)
GLUCOSE BLD-MCNC: 120 MG/DL (ref 65–105)
GLUCOSE BLD-MCNC: 121 MG/DL (ref 65–105)
GLUCOSE BLD-MCNC: 95 MG/DL (ref 65–105)
GLUCOSE BLD-MCNC: 99 MG/DL (ref 65–105)
GLUCOSE SERPL-MCNC: 102 MG/DL (ref 74–99)
HCT VFR BLD AUTO: 23.9 % (ref 36.3–47.1)
HCT VFR BLD AUTO: 36.9 % (ref 36.3–47.1)
HGB BLD-MCNC: 11.6 G/DL (ref 11.9–15.1)
HGB BLD-MCNC: 7.3 G/DL (ref 11.9–15.1)
INTERPRETATION SERPL IFE-IMP: NORMAL
MCH RBC QN AUTO: 33.3 PG (ref 25.2–33.5)
MCHC RBC AUTO-ENTMCNC: 30.5 G/DL (ref 28.4–34.8)
MCV RBC AUTO: 109.1 FL (ref 82.6–102.9)
NRBC BLD-RTO: 0 PER 100 WBC
PATH REV: NORMAL
PATHOLOGIST: ABNORMAL
PLATELET # BLD AUTO: 108 K/UL (ref 138–453)
PMV BLD AUTO: 11.2 FL (ref 8.1–13.5)
POTASSIUM SERPL-SCNC: 4.4 MMOL/L (ref 3.7–5.3)
PROT PATTERN SERPL ELPH-IMP: ABNORMAL
PROT SERPL-MCNC: 5.7 G/DL (ref 6.4–8.3)
RBC # BLD AUTO: 2.19 M/UL (ref 3.95–5.11)
SODIUM SERPL-SCNC: 137 MMOL/L (ref 136–145)
TOTAL PROT. SUM,%: 101 % (ref 98–102)
TOTAL PROT. SUM: 5.7 G/DL (ref 6.3–8.2)
WBC OTHER # BLD: 8.4 K/UL (ref 3.5–11.3)

## 2023-09-21 PROCEDURE — 93306 TTE W/DOPPLER COMPLETE: CPT

## 2023-09-21 PROCEDURE — 80048 BASIC METABOLIC PNL TOTAL CA: CPT

## 2023-09-21 PROCEDURE — 85018 HEMOGLOBIN: CPT

## 2023-09-21 PROCEDURE — 85027 COMPLETE CBC AUTOMATED: CPT

## 2023-09-21 PROCEDURE — 93306 TTE W/DOPPLER COMPLETE: CPT | Performed by: INTERNAL MEDICINE

## 2023-09-21 PROCEDURE — 82947 ASSAY GLUCOSE BLOOD QUANT: CPT

## 2023-09-21 PROCEDURE — 1200000000 HC SEMI PRIVATE

## 2023-09-21 PROCEDURE — 85014 HEMATOCRIT: CPT

## 2023-09-21 PROCEDURE — 97530 THERAPEUTIC ACTIVITIES: CPT

## 2023-09-21 PROCEDURE — 6370000000 HC RX 637 (ALT 250 FOR IP): Performed by: INTERNAL MEDICINE

## 2023-09-21 PROCEDURE — 36415 COLL VENOUS BLD VENIPUNCTURE: CPT

## 2023-09-21 PROCEDURE — 2580000003 HC RX 258: Performed by: NURSE PRACTITIONER

## 2023-09-21 PROCEDURE — 2580000003 HC RX 258: Performed by: INTERNAL MEDICINE

## 2023-09-21 RX ORDER — SODIUM CHLORIDE 9 MG/ML
INJECTION, SOLUTION INTRAVENOUS CONTINUOUS
Status: DISCONTINUED | OUTPATIENT
Start: 2023-09-21 | End: 2023-09-21

## 2023-09-21 RX ADMIN — AMLODIPINE BESYLATE 10 MG: 10 TABLET ORAL at 10:08

## 2023-09-21 RX ADMIN — DOXYCYCLINE HYCLATE 100 MG: 100 TABLET ORAL at 22:15

## 2023-09-21 RX ADMIN — ANTI-FUNGAL POWDER MICONAZOLE NITRATE TALC FREE: 1.42 POWDER TOPICAL at 10:10

## 2023-09-21 RX ADMIN — LEVOTHYROXINE SODIUM 100 MCG: 100 TABLET ORAL at 10:09

## 2023-09-21 RX ADMIN — SODIUM CHLORIDE: 9 INJECTION, SOLUTION INTRAVENOUS at 00:42

## 2023-09-21 RX ADMIN — DOXYCYCLINE HYCLATE 100 MG: 100 TABLET ORAL at 10:08

## 2023-09-21 RX ADMIN — SODIUM CHLORIDE, PRESERVATIVE FREE 10 ML: 5 INJECTION INTRAVENOUS at 22:15

## 2023-09-21 RX ADMIN — ANTI-FUNGAL POWDER MICONAZOLE NITRATE TALC FREE: 1.42 POWDER TOPICAL at 22:14

## 2023-09-21 NOTE — PLAN OF CARE
Problem: Discharge Planning  Goal: Discharge to home or other facility with appropriate resources  9/21/2023 0208 by Gavi Melo RN  Outcome: Progressing  9/20/2023 1755 by Manjinder Chandler RN  Outcome: Progressing     Problem: Safety - Adult  Goal: Free from fall injury  9/21/2023 0208 by Gavi Melo RN  Outcome: Progressing  9/20/2023 1755 by Manjinder Chandler RN  Outcome: Progressing     Problem: ABCDS Injury Assessment  Goal: Absence of physical injury  9/21/2023 0208 by Gavi Melo RN  Outcome: Progressing  9/20/2023 1755 by Manjinder Chandler RN  Outcome: Progressing     Problem: Skin/Tissue Integrity  Goal: Absence of new skin breakdown  Description: 1. Monitor for areas of redness and/or skin breakdown  2. Assess vascular access sites hourly  3. Every 4-6 hours minimum:  Change oxygen saturation probe site  4. Every 4-6 hours:  If on nasal continuous positive airway pressure, respiratory therapy assess nares and determine need for appliance change or resting period.   9/21/2023 0208 by Gavi Melo RN  Outcome: Progressing  9/20/2023 1755 by Manjinder Chandler RN  Outcome: Progressing     Problem: Neurosensory - Adult  Goal: Achieves stable or improved neurological status  9/21/2023 0208 by Gavi Melo RN  Outcome: Progressing  9/20/2023 1755 by Manjinder Chandler RN  Outcome: Progressing  Goal: Achieves maximal functionality and self care  9/21/2023 0208 by Gavi Melo RN  Outcome: Progressing  9/20/2023 1755 by Manjinder Chandler RN  Outcome: Progressing     Problem: Respiratory - Adult  Goal: Achieves optimal ventilation and oxygenation  9/20/2023 1755 by Manjinder Chandler RN  Outcome: Progressing     Problem: Cardiovascular - Adult  Goal: Maintains optimal cardiac output and hemodynamic stability  9/21/2023 0208 by Gavi Melo RN  Outcome: Progressing  9/20/2023

## 2023-09-21 NOTE — CARE COORDINATION
HD REFERRAL STATUS    Clinic: [facility]     Chair Time/Schedule: [facility] 5:15am    Start Date: 9/22/23 pending discharge date. Financial Clearance: Approved    Medical Clearance: Approved    -Patient's OP HD chair information confirmed. See above. Await discharge plans as patient may need SNF.

## 2023-09-21 NOTE — PROGRESS NOTES
_                         Today's Date: 9/21/2023  Patient Name: Tammi Hurley  Date of admission: 9/17/2023 12:17 PM  Patient's age: 80 y.o., 1934  Admission Dx: Acute kidney injury ([address]) [N17.9]  DARIUSZ (acute kidney injury) ([address]) [N17.9]      Requesting Physician: Yvan Rizvi MD    CHIEF COMPLAINT: Altered mental status. DARIUSZ. Dehydration. Consult for bone lytic lesions of the skull. SUBJECTIVE:  Patient was seen and examined. Patient is clinically stable. No new events. She had EGD. She had multiple gallbladder stones. No significant findings. No active bleeding. No fever. BRIEF CASE HISTORY:    The patient is a 80 y.o.  female who is admitted to the hospital for further management of altered mental status and DARIUSZ and dehydration. The patient had 2 weeks history of increasing weakness and fatigue and decreased oral intake. She had altered mental status on admission but it has improved later on. She was found to have DARIUSZ and she had dialysis. Mentally she has improved significantly. Further work-up with imaging of the head CT scan showed lytic lesions in the frontal skull. No brain lesions were noted. Further lab work-up showed elevated kappa light chain immunoglobulin at 7168. We have been consulted for further management. Patient is awake and alert and she has no complaints at the present time. No bone aches. No fever. No active bleeding. Past Medical History:   has a past medical history of Hx of blood clots, Hyperlipidemia, Hypertension, Pulmonary embolism ([address]), and Thyroid disease. Past Surgical History:   has a past surgical history that includes IR NONTUNNELED VASCULAR CATHETER > 5 YEARS (9/18/2023) and ERCP (N/A, 9/20/2023). Family History: family history is not on file. Social History:   reports that she has never smoked.  She has never used smokeless tobacco. She reports that she does not currently normal speech, no focal findings or movement disorder noted  Musculoskeletal - no joint tenderness, deformity or swelling  Extremities - peripheral pulses normal, no pedal edema, no clubbing or cyanosis  Skin - normal coloration and turgor, no rashes, no suspicious skin lesions noted           DATA:      Labs:       CBC:   Recent Labs     09/20/23  1411 09/21/23  0551 09/21/23  1043   WBC 11.3 8.4  --    HGB 11.9 7.3* 11.6*   HCT 36.4 23.9* 36.9    108*  --      BMP:   Recent Labs     09/20/23  1411 09/21/23  1043    137   K 4.3 4.4   CO2 25 27   BUN 33* 20   CREATININE 5.7* 4.2*   LABGLOM 7* 10*   GLUCOSE 111* 102*     PT/INR:   No results for input(s): \"PROTIME\", \"INR\" in the last 72 hours. APTT:  Recent Labs     09/20/23  0221 09/20/23  0756   APTT 30.1 29.3     LIVER PROFILE:  No results for input(s): \"AST\", \"ALT\", \"LABALBU\" in the last 72 hours. Echo (TTE) complete (with contrast/ bubble/ strain/ 3D PRN)    Left Ventricle: Normal left ventricular systolic function with a   visually estimated EF of 55 - 60%. Left ventricle size is normal. Mildly   increased wall thickness. Normal wall motion. Right Ventricle: Not well visualized. Mitral Valve: Mildly thickened leaflet. Left Atrium: Left atrium is mildly dilated.     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

## 2023-09-21 NOTE — PROGRESS NOTES
09/21/23  1043   WBC 8.6 11.3 8.4  --    HGB 11.4* 11.9 7.3* 11.6*   HCT 36.4 36.4 23.9* 36.9   .7* 104.6* 109.1*  --    MCHC 31.3 32.7 30.5  --    RDW 13.9 13.6 13.9  --     236 108*  --        Immature PLTs   No results found for: \"PLTFLUORE\"    PT/INR  No results for input(s): \"PROTIME\", \"INR\" in the last 72 hours. ANEMIA STUDIES  No results for input(s): \"LABIRON\", \"TIBC\", \"IRON\", \"FERRITIN\", \"RENVQAVB81\", \"FOLATE\", \"OCCULTBLD\" in the last 72 hours. BMP  Recent Labs     09/19/23  0927 09/20/23  1411 09/21/23  1043    137 137   K 4.4 4.3 4.4   CL 99 98 97*   CO2 23 25 27   BUN 41* 33* 20   CREATININE 6.6* 5.7* 4.2*   GLUCOSE 108* 111* 102*   CALCIUM 8.6 9.1 8.7       LFTS  No results for input(s): \"ALKPHOS\", \"ALT\", \"AST\", \"BILITOT\", \"BILIDIR\", \"LABALBU\" in the last 72 hours. AMYLASE/LIPASE/AMMONIA  No results for input(s): \"AMYLASE\", \"LIPASE\", \"AMMONIA\" in the last 72 hours.     Acute Hepatitis Panel   Lab Results   Component Value Date/Time    HEPBSAG NONREACTIVE 09/18/2023 02:59 PM    HEPCAB NONREACTIVE 09/18/2023 02:59 PM    HEPBIGM NONREACTIVE 09/18/2023 02:30 PM    HEPAIGM NONREACTIVE 09/18/2023 02:30 PM       HCV Genotype   No results found for: \"HEPATITISCGENOTYPE\"    HCV Quantitative   No results found for: \"HCVQNT\"    LIVER WORK UP:    AFP  No results found for: \"AFP\"    Alpha 1 antitrypsin   No results found for: \"A1A\"    Anti - Liver/Kidney Ab  No results found for: \"LIVER-KIDNEYMICROSOMALAB\"    LEX  No results found for: \"LEX\"    AMA  No results found for: \"MITOAB\"    ASMA  No results found for: \"SMOOTHMUSCAB\"    Ceruloplasmin  No results found for: \"CERULOPLSM\"    Celiac panel  Lab Results   Component Value Date/Time    IGA 82 09/20/2023 02:21 AM       IgG  Lab Results   Component Value Date/Time     09/20/2023 02:21 AM       IgM  Lab Results   Component Value Date/Time    IGM <25 09/20/2023 02:21 AM       GGT   No results found for: \"LABGGT\"    PT/INR  No results for input(s): \"PROTIME\", \"INR\" in the last 72 hours. Cancer Markers:  CEA:  No results found for: \"CEA\"  Ca 125:  No results found for: \"\"  Ca 19-9:   No results found for: \"\"  AFP: No results found for: \"AFP\"    Lactic acid:No results for input(s): \"LACTACIDWB\" in the last 72 hours. Radiology Review:    . [facility] FOR SURGICAL PROCEDURES   Final Result      CT HEAD WO CONTRAST   Final Result   1. No acute intracranial abnormality. 2. Mild chronic white matter microvascular ischemic changes. 3. Lytic lesions in the right frontal bone, squamous portion of the left   temporal bone, and scattered throughout the calvarium concerning for   metastasis or multiple myeloma until proven otherwise. CT CHEST ABDOMEN PELVIS WO CONTRAST Additional Contrast? None   Final Result   1. Choledocholithiasis with multiple stones in the mid to distal common bile   duct and mild upstream dilatation. 2.  Cholelithiasis without CT evidence for acute cholecystitis or   pancreatitis. 3.  Subtle asymmetric left perinephric stranding. No hydronephrosis. Clinical correlation is recommended as to the possibility of underlying   urinary tract infection. 4.  Small pleural effusions. 5.  Diverticulosis. IR NONTUNNELED VASCULAR CATHETER > 5 YEARS   Final Result   Successful ultrasound and fluoroscopy guided non-tunneled temporary   hemodialysis catheter placement. Okay to use. US RENAL LIMITED   Final Result   No hydronephrosis. Mildly echogenic kidneys suggestive of intrinsic renal   parenchymal disease.          IR TUNNELED CVC PLACE WO SQ PORT/PUMP > 5 YEARS    (Results Pending)          ENDOSCOPY   Date:                           9/20/2023   Endoscopist:             Cristofer Landeros MD, [facility]     Procedure: ERCP with sphincterotomy and balloon extraction of multiple stones                                                    Indication: Common bile duct stones on CT of collaboration with Dr. Geovanny Goldsmith  Please feel free to contact me with any questions or concerns. Thank you for allowing me to participate in the care of your patient. AARON Mann - CNP on 9/21/2023 at 2:44 PM   THE [facility] AT [facility]    Please note that this note was generated using a voice recognition dictation software. Although every effort was made to ensure the accuracy of this automated transcription, some errors in transcription may have occurred.

## 2023-09-21 NOTE — PROGRESS NOTES
Physical Therapy  Facility/Department: [address] STEPDOWN  Physical Therapy Progress Note    Name: Willy Ha  : 1934  MRN: [id number]  Date of Service: 2023    Discharge Recommendations:  Patient would benefit from continued therapy after discharge   PT Equipment Recommendations  Equipment Needed: No    Assessment   Pt alert, reluctantly cooperative--re-educated re: importance of being OOB/up to chair to avoid the ill effects of bed rest.  Pt very fearful of falling this date (she required much less assistance during PT eval and was able to ambulate with a rwalker), requiring use of yoni stedy to get her to the chair d/t pt resisting standing d/t fear of falling. Pt would benefit from continued therapies to improve her mobility and functional independence. Body Structures, Functions, Activity Limitations Requiring Skilled Therapeutic Intervention: Decreased functional mobility ; Decreased strength;Decreased balance;Decreased cognition;Decreased endurance;Decreased posture  Therapy Prognosis: Good  Decision Making: Medium Complexity  Barriers to Learning: decreased cognition  Requires PT Follow-Up: Yes  Activity Tolerance  Activity Tolerance: Treatment limited secondary to decreased cognition  Activity Tolerance Comments: pt fearful of falling, resistive to standing; able to stand with yoni stedy, but afraid of falling while moving in stedy from bed to chair with A+2     Plan   Physcial Therapy Plan  General Plan:  (5-6 visits weekly)  Current Treatment Recommendations: Strengthening, ROM, Balance training, Endurance training, Safety education & training, Patient/Caregiver education & training, Therapeutic activities, Functional mobility training, Transfer training, Gait training, Stair training, Equipment evaluation, education, & procurement, Home exercise program, Neuromuscular re-education, Positioning  Safety Devices  Type of Devices: Nurse notified, Gait belt, Call light within reach, Patient at risk for falls, Chair alarm in place, Left in chair  Restraints  Restraints Initially in Place: No     Restrictions  Restrictions/Precautions  Restrictions/Precautions: Fall Risk, Up as Tolerated  Required Braces or Orthoses?: No     Subjective   Pain: no c/o; fearful of falling this date  General  Patient assessed for rehabilitation services?: Yes  Response To Previous Treatment: Patient with no complaints from previous session. Family / Caregiver Present: No  Follows Commands: Impaired  Other (Comment): pt needed repetition, visual and tactile cues    Cognition   Orientation  Overall Orientation Status: Impaired  Orientation Level: Oriented to time;Oriented to person;Disoriented to place; Disoriented to situation  Cognition  Overall Cognitive Status: Exceptions  Arousal/Alertness: Delayed responses to stimuli  Following Commands: Follows one step commands with increased time; Follows one step commands with repetition  Attention Span: Attends with cues to redirect  Safety Judgement: Decreased awareness of need for assistance;Decreased awareness of need for safety  Problem Solving: Assistance required to identify errors made;Assistance required to correct errors made  Insights: Decreased awareness of deficits  Initiation: Requires cues for all  Sequencing: Requires cues for all     Objective   Pulse: 68  Heart Rate Source: Monitor  BP: (!) 142/59  BP Location: Right upper arm  BP Method: Automatic  Patient Position: Semi fowlers  MAP (Calculated): 87  Respirations: 18  SpO2: 95 %  O2 Device: Nasal cannula     Observation/Palpation  Posture: Fair     Bed mobility  Rolling to Right: Moderate assistance  Supine to Sit: Moderate assistance (pt very fearful of falling this date, required assistance for all mobility secondary to same)  Bed Mobility Comments: HOB elevated to 40 degrees with use of bed rails  Pt able to dangle EOB ~30 minutes with SBA+1--pt very anxious re: falling and required constant cues and

## 2023-09-21 NOTE — PROGRESS NOTES
Renal Progress Note    Patient :  Kelsy Hernandez; 80 y.o. MRN# [id number]  Location:  4391/9550-15  Attending:  Alexsander Rodriguez MD  Admit Date:  9/17/2023   Hospital Day: 4    Subjective:     Patient was seen and examined. No new issues reported overnight. BMP results from today reviewed sodium 137, potassium 4.4, chloride 97, bicarb 24, calcium 8.7, BUN 20, creatinine 4.2 mg/dl. Patient did get dialysis as per day 3 yesterday. Patient currently has Luis catheter in place, to get tunnel catheter done by IR in AM.  Hemodialysis initiated on 9/18/2023. Patient did get ERCP with sphincterotomy and balloon extraction of multiple stones due to common bile duct stones done 9/20/2023. Serology showed K/L 551.38 with SPEP and immunofixation positive for IgG kappa. CT head without contrast done on 9/18/2023 showed lytic lesions right frontal bone, ischemic portion of left temporal bone, scattered throughout calvarium concerning for metastasis or multiple myeloma until proven otherwise. Heme-onc on board.  did set up outpatient with dialysis spot at [facility] on MWF schedule.     Outpatient Medications:     Medications Prior to Admission: apixaban (ELIQUIS) 2.5 MG TABS tablet, Take 1 tablet by mouth 2 times daily  amLODIPine (NORVASC) 5 MG tablet, Take 1 tablet by mouth daily  atenolol (TENORMIN) 100 MG tablet, Take 1 tablet by mouth daily  ezetimibe (ZETIA) 10 MG tablet, Take 1 tablet by mouth daily  levothyroxine (SYNTHROID) 100 MCG tablet, Take 1 tablet by mouth daily  pantoprazole (PROTONIX) 40 MG tablet, Take 1 tablet by mouth daily (Patient not taking: Reported on 9/17/2023)  aspirin 81 MG EC tablet, Take 81 mg by mouth daily (Patient not taking: Reported on 9/17/2023)    Current Medications:     Scheduled Meds:    miconazole   Topical BID    doxycycline hyclate  100 mg Oral 2 times per day    lactobacillus  1 capsule Oral Daily with breakfast    sodium chloride flush  5-40 mL Date/Time    CLUR 41 09/18/2023 02:53 PM     Urine Creatinine:     Lab Results   Component Value Date/Time    LABCREA 90.8 09/18/2023 02:53 PM     Radiology:     Reviewed. Assessment:     Acute Kidney Injury likely prerenal due to severe dehydration and further leading to ATN in the setting of likely multiple myeloma, baseline creatinine seems to normal, creatinine currently quite elevated and did not seem to respond to I current management and required initiation of dialysis since 9/18/2023. Currently has a Luis catheter in place. Hyperkalemia-currently stable with medical management. Hypertension. Bradycardia. History of PE on Eliquis. Confusion/possible uremia. Urinary retention. 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 Lytic brain lesions. Multiple myeloma- Serology showed K/L 551.38 with SPEP and immunofixation positive for IgG kappa. Plan:   No acute need for dialysis today. Will need dialysis in a.m. and will run on MWF schedule. To get tunneled catheter in a.m. by IR. Keep renal diet including fluid restrictions less than 1500 cc/day. Follow-up heme-onc plan. BMP in AM.   did set up outpatient with dialysis spot at "[facility] on MWF schedule. Will follow. Nutrition   Please ensure that patient is on a renal diet/TF. Avoid nephrotoxic drugs/contrast exposure. Shira Hernandez MD  Internal medicine, PGY-3  [facility], [facility]   9/21/2023 11:44 AM    Attending Physician Statement  I have discussed the care of this patient, including pertinent history and exam findings, with the Resident/CNP. I have reviewed and edited the key elements of all parts of the encounter with the Resident/CNP. I agree with the assessment, plan and orders as documented by the Resident/CNP. Arjun Tong MD   Nephrology [address] [facility]    This note is created with the assistance of a speech-recognition program. While intending to generate a document that actually reflects the content of the visit, no guarantees can be provided that every mistake has been identified and corrected by editing.

## 2023-09-21 NOTE — CARE COORDINATION
Transitional planning. VM from [facility] requesting return call. [phone] Phone call from [place] with [facility]. They have no beds available. [phone] Left HIPPA compliant message for [facility] that writer was returning her call. Call back number provided. [phone] Phone call from [address] [place] [address] requesting an update on discharge placement plan. Update provided. All questions answered. She provided that the patients brother and nephew will be arriving from out of state on Monday to meet with patient's  (who is the financial POA) and the patient to discuss future plans and see what needs to be done and how they can help. She states that patient voiced to her that she does not want to go to [facility] and she does not feel patient would do well there at her age and condition. She feels that she would be best in a SNF with in house dialysis. Writer will email her a list of SNFs with in house dialysis to [email or web address].    [phone] Emailed list of SNFs with in house dialysis to [facility]. 8 [facility], [address] [place] [address]. 3 [facility] to patient at bedside to confirm that patient does not want ARU (patient confirmed does not want ARU) and to update on speaking with Amanda Villalobos and her concern for needing a facility with in house dialysis.

## 2023-09-21 NOTE — PROGRESS NOTES
36 - Dr. Oxana Fuentes with Nephrology at bedside. NS @ 75ml/hr discontinued. MD will get back with need for walton catheter. 12 - Dr. Archie Girard at bedside. Aware of NS discontinued. Stated to resume regular diet if IR is not taking patient for procedure until tomorrow. NPO at midnight. 2000 [facility] NP with GI at bedside. Concerned with drop in hgb. H&H ordered. Will start diet after results are in and okay with GI.    1125 - IR called to confirm patient is not going today. Patient will have procedure done tomorrow per IR RN.     3600 [facility] NP at bedside. HGB recheck stable. Okay to start patient on regular diet. Heparin gtt to be held for 5 days d/t procedure yesterday. NP aware of PE history. 5 - Dr. Archie Girard notified about patient complaining of soreness in mouth.

## 2023-09-21 NOTE — PROGRESS NOTES
Pt scheduled in IR  at 8am for a temp to perm conversion. Unable to send for pt due to pt going to echo this am and transport already at bedside to take pt to echo.   If unable to accommodate due to IR schedule perm cath will be done tomorrow

## 2023-09-22 ENCOUNTER — APPOINTMENT (OUTPATIENT)
Dept: DIALYSIS | Age: 88
DRG: 840 | End: 2023-09-22
Attending: INTERNAL MEDICINE
Payer: MEDICARE

## 2023-09-22 LAB
ANA SER QL IA: ABNORMAL
ANION GAP SERPL CALCULATED.3IONS-SCNC: 16 MMOL/L (ref 9–17)
BUN SERPL-MCNC: 38 MG/DL (ref 8–23)
CALCIUM SERPL-MCNC: 8.4 MG/DL (ref 8.6–10.4)
CHLORIDE SERPL-SCNC: 94 MMOL/L (ref 98–107)
CO2 SERPL-SCNC: 23 MMOL/L (ref 20–31)
CREAT SERPL-MCNC: 6.2 MG/DL (ref 0.5–0.9)
DSDNA IGG SER QL IA: 12 IU/ML
GFR SERPL CREATININE-BSD FRML MDRD: 6 ML/MIN/1.73M2
GLUCOSE BLD-MCNC: 100 MG/DL (ref 65–105)
GLUCOSE BLD-MCNC: 101 MG/DL (ref 65–105)
GLUCOSE BLD-MCNC: 108 MG/DL (ref 65–105)
GLUCOSE BLD-MCNC: 110 MG/DL (ref 65–105)
GLUCOSE SERPL-MCNC: 99 MG/DL (ref 70–99)
MICROORGANISM SPEC CULT: NORMAL
MICROORGANISM SPEC CULT: NORMAL
NUCLEAR IGG SER IA-RTO: 0.5 U/ML
P E INTERPRETATION, U: NORMAL
PATHOLOGIST: NORMAL
POTASSIUM SERPL-SCNC: 4.4 MMOL/L (ref 3.7–5.3)
SERVICE CMNT-IMP: NORMAL
SERVICE CMNT-IMP: NORMAL
SODIUM SERPL-SCNC: 133 MMOL/L (ref 135–144)
SPECIMEN DESCRIPTION: NORMAL
SPECIMEN DESCRIPTION: NORMAL
SPECIMEN TYPE: NORMAL
SPECIMEN TYPE: NORMAL
SPECIMEN VOL UR: NORMAL ML
URINE IFX INTERP: NORMAL
URINE TOTAL PROTEIN: 252 MG/DL
URINE TOTAL PROTEIN: 256 MG/DL

## 2023-09-22 PROCEDURE — 97530 THERAPEUTIC ACTIVITIES: CPT

## 2023-09-22 PROCEDURE — 97535 SELF CARE MNGMENT TRAINING: CPT

## 2023-09-22 PROCEDURE — 82947 ASSAY GLUCOSE BLOOD QUANT: CPT

## 2023-09-22 PROCEDURE — 1200000000 HC SEMI PRIVATE

## 2023-09-22 PROCEDURE — 6360000002 HC RX W HCPCS: Performed by: INTERNAL MEDICINE

## 2023-09-22 PROCEDURE — 36415 COLL VENOUS BLD VENIPUNCTURE: CPT

## 2023-09-22 PROCEDURE — 6370000000 HC RX 637 (ALT 250 FOR IP): Performed by: INTERNAL MEDICINE

## 2023-09-22 PROCEDURE — 2580000003 HC RX 258: Performed by: INTERNAL MEDICINE

## 2023-09-22 PROCEDURE — 90935 HEMODIALYSIS ONE EVALUATION: CPT

## 2023-09-22 PROCEDURE — 97116 GAIT TRAINING THERAPY: CPT

## 2023-09-22 PROCEDURE — 80048 BASIC METABOLIC PNL TOTAL CA: CPT

## 2023-09-22 PROCEDURE — 97110 THERAPEUTIC EXERCISES: CPT

## 2023-09-22 RX ADMIN — HEPARIN SODIUM 1000 UNITS: 1000 INJECTION INTRAVENOUS; SUBCUTANEOUS at 19:32

## 2023-09-22 RX ADMIN — Medication 500000 UNITS: at 22:05

## 2023-09-22 RX ADMIN — DOXYCYCLINE HYCLATE 100 MG: 100 TABLET ORAL at 09:00

## 2023-09-22 RX ADMIN — LEVOTHYROXINE SODIUM 100 MCG: 100 TABLET ORAL at 07:46

## 2023-09-22 RX ADMIN — SODIUM CHLORIDE, PRESERVATIVE FREE 10 ML: 5 INJECTION INTRAVENOUS at 07:59

## 2023-09-22 RX ADMIN — ANTI-FUNGAL POWDER MICONAZOLE NITRATE TALC FREE: 1.42 POWDER TOPICAL at 07:46

## 2023-09-22 RX ADMIN — HEPARIN SODIUM 1200 UNITS: 1000 INJECTION INTRAVENOUS; SUBCUTANEOUS at 19:34

## 2023-09-22 RX ADMIN — DOXYCYCLINE HYCLATE 100 MG: 100 TABLET ORAL at 22:05

## 2023-09-22 RX ADMIN — Medication 500000 UNITS: at 13:52

## 2023-09-22 RX ADMIN — SODIUM CHLORIDE, PRESERVATIVE FREE 10 ML: 5 INJECTION INTRAVENOUS at 22:06

## 2023-09-22 RX ADMIN — ANTI-FUNGAL POWDER MICONAZOLE NITRATE TALC FREE: 1.42 POWDER TOPICAL at 22:05

## 2023-09-22 RX ADMIN — Medication 500000 UNITS: at 16:41

## 2023-09-22 RX ADMIN — Medication 1 CAPSULE: at 07:46

## 2023-09-22 RX ADMIN — AMLODIPINE BESYLATE 10 MG: 10 TABLET ORAL at 07:46

## 2023-09-22 NOTE — PROGRESS NOTES
[facility]  Office: [phone]  [place] Ta Blood, DO, Isaak Bustos MD, Leena Dixon MD, Tanja Calzada MD, Lita Mendez MD,  Ronit Turpin MD, Laurie Euceda MD, Inga Tracy DO, Cristina Dumas MD,  Puja Emmanuel, DO, Niles Valle MD, Michelle Ely MD, Louann Galvan DO, Carlos Manuel Chavez MD,  Jasmyn Spears DO, Swathi Weller MD, Noemi Lemons MD, Sondra Marion MD, Cortez Pena MD,  Antoni Woods MD, Courtney Duran MD, Bubba Schwab, MD, Compa Narayanan MD, Zbigniew Castillo DO, Julio Russell MD,  Windy Fuller MD, Camille Duarte MD, Milena Acevedo, CNP,  Niki Remy, CNP,, Jewels Spears, CNP,  Chinedu Romero, BOBBI, Jose Jackson, CNP, Mekhi Cardozo, CNP, Orin Reyna, CNP, Timoteo Weber, CNP, Anu Garcia, CNP, Debi Howard, CNP, Morgan Hernandez, [facility], Guilherme Winters, CNP, Kelly Campos, [facility]    Progress Note    9/22/2023    12:07 PM    Name:   Camille Duarte  MRN:     [id number]     Acct:      [de-identified]   Room:   [address] [place] [address]  IP Day:  5  Admit Date:  9/17/2023 12:17 PM    PCP:   Babar Rueda MD  Code Status:  Full Code    Subjective:     C/C: Fatigue, DARIUSZ  Interval History Status: not changed. DARUISZ 2/2 Multiple Myeloma  No overnight events  Tunnel cath Monday, dialysis today  S/p ERCP  No complaints    Brief History:     Pt is an 79 yo F w/ PMHx of HTN started on atenolol & amlodipine, PE x2 started on Eliquis, dyslipidemia, & hypothyroidism who was transferred to us from [facility]. She presented to the ER w/ 2wk hx of weakness & lethargy. Pt was found to be in acute kidney injury w/ Cr of 9 & K+ of more than 6. She was transferred  here for further evaluation. Pt says that she has not been feeling well for the last 2 weeks. She just did not have an appetite & was not eating or drinking well.   Denies and time and normal affect  Lungs:  clear to auscultation bilaterally, normal effort  Heart:  regular rate and rhythm, no murmur  Abdomen:  soft, nontender, nondistended, normal bowel sounds, no masses, hepatomegaly, splenomegaly  Extremities:  no edema, redness, tenderness in the calves  Skin:  no gross lesions, rashes, induration    Assessment:        Hospital Problems             Last Modified POA    * (Principal) Acute kidney injury ([address]) 9/17/2023 Yes    DARIUZS (acute kidney injury) ([address]) 9/17/2023 Yes    Hypertension, essential 9/18/2023 Yes    Duodenal ulcer 9/17/2023 Yes    Hyperlipidemia 9/17/2023 Yes    Hypothyroidism 9/17/2023 Yes    Pulmonary embolism ([address]) 9/17/2023 Yes    Edema 9/17/2023 Yes    Hyperkalemia 9/18/2023 Yes    Confusion 9/18/2023 Yes    Goals of care, counseling/discussion 9/18/2023 Yes    Encounter for palliative care 9/18/2023 Yes    Choledocholithiasis 9/19/2023 Yes    Multiple myeloma not having achieved remission ([address]) 9/19/2023 Yes    Dependence on renal dialysis ([address]) 9/20/2023 Yes    Paraproteinemia 9/21/2023 Yes       Plan:        DARIUSZ secondary to multiple myeloma  - Nephrology following. HD today  - Discussed DARIUSZ w/ POA. Continue dialysis & still planning for port placement Monday. Oral Thrush  - Nystatin rinse    Acute Delirium  - Problem resolved. Choledocholithiasis  - CT abd/pelvis w/o contrast showed multiple stones in CBD.  - S/p ERCP     Multiple Myeloma  - CT head showed multiple lytic lesions  - Increased Kappa/Lambda ratio  - Pt refusing further testing & treatment. POA informed of diagnosis & decisions to be discussed between patient & POA.   - Heme following     Hx of pulmonary embolism x2  - Continue home Eliquis     Essential HTN  - Continue Norvasc. Atenolol discontinued d/t bradycardia.      Hypothyroidism  - Continue levothyroxine    Danny Arias  9/22/2023  12:07 PM     Attending Zahraa Whaley MD, have seen

## 2023-09-22 NOTE — FLOWSHEET NOTE
Rn entered patients room this morning to do morning tasks. Woke patient up and her response was Praneeth Goldsmith are we doing? \" I then told her good morning and that I was coming to get her day started. Patient responded, \"No, I want to sleep. Leave me alone. \" RN asked patient if she was willing to get washed up this morning and she again responded, \"No, I want to sleep. You all never let me sleep\". Will notify day team that RN was unable to give patient her CHG bath this morning.

## 2023-09-22 NOTE — PROGRESS NOTES
Occupational Therapy  Facility/Department: [facility]  Occupational Daily Treatment Note    Name: Jan Reinoso  : 1934  MRN: [id number]  Date of Service: 2023    Discharge Recommendations:  Patient would benefit from continued therapy after discharge  Patient Diagnosis(es): The encounter diagnosis was Edema, unspecified type. Past Medical History:  has a past medical history of Hx of blood clots, Hyperlipidemia, Hypertension, Pulmonary embolism ([address]), and Thyroid disease. Past Surgical History:  has a past surgical history that includes IR NONTUNNELED VASCULAR CATHETER > 5 YEARS (2023) and ERCP (N/A, 2023). Assessment   Performance deficits / Impairments: Decreased functional mobility ; Decreased ADL status; Decreased strength;Decreased safe awareness;Decreased cognition;Decreased endurance;Decreased high-level IADLs  Prognosis: Good  Activity Tolerance  Activity Tolerance: Patient Tolerated treatment well  Activity Tolerance Comments: Pt followed all commands with increased time and occassional cueing with good return. Plan   Occupational Therapy Plan  Times Per Week: 3-5 x/wk  Current Treatment Recommendations: Strengthening, Balance training, Functional mobility training, Endurance training, Cognitive reorientation, Safety education & training, Patient/Caregiver education & training, Equipment evaluation, education, & procurement, Self-Care / ADL, Home management training     Restrictions  Restrictions/Precautions  Restrictions/Precautions: Fall Risk, Up as Tolerated  Required Braces or Orthoses?: No  Pain assessment: Pt denies pain this day. Subjective   General  Patient assessed for rehabilitation services?: Yes  Family / Caregiver Present: No  Safety Devices  Type of Devices: All fall risk precautions in place;Call light within reach; Chair alarm in place;Gait belt;Nurse notified; Left in chair  Balance  Sitting: Without support (SBA seated EOB.)  Standing: With support correct errors made  Insights: Decreased awareness of deficits  Initiation: Requires cues for some  Sequencing: Requires cues for some  Orientation  Overall Orientation Status: Impaired  Orientation Level: Oriented to place;Oriented to person;Oriented to time;Disoriented to situation  Education Given To: Patient  Education Provided Comments: OT POC, transfer/walker safety, importance of participation in therapy, fall prevention with good return. AM-PAC Score  AM-PAC Inpatient Daily Activity Raw Score: 19 (09/22/23 1338)  AM-PAC Inpatient ADL T-Scale Score : 40.22 (09/22/23 1338)  ADL Inpatient CMS 0-100% Score: 42.8 (09/22/23 1338)  ADL Inpatient CMS G-Code Modifier : CK (09/22/23 1338)    Goals  Short Term Goals  Time Frame for Short Term Goals: By discharge, pt will:  Short Term Goal 1: Demo SUP for functional transfers and functional mobility with use of LRAD for engagement in ADLs/IADLs  Short Term Goal 2: Follow 50% of 2-step commands with appropriate initiation and sequencing for improved participation in daily occupations  Short Term Goal 3: Demo Mod I for UB ADLs following setup  Short Term Goal 4: Demo SBA for LB ADLs and toileting tasks with setup and AE PRN  Short Term Goal 5: Demo 8 min dynamic standing and reaching outside HARISH with unilateral UE release and SBA to increase standing balance/tolerance for ADL/IADL participation    Co- treatment with PT warranted secondary to decreased patient safety and independence with functional mobility requiring skilled physical assistance of two professionals to simultaneously address individualized discipline goals. OT is addressing ADL care and cognition/commands , while PT is addressing their individualized functional mobility task. Therapy Time   Individual Concurrent Group Co-treatment   Time In 1135         Time Out 1220         Minutes 45         Timed Code Treatment Minutes: 23 Minutes            Co tx PT.  Extended physician interruption of session. Pt supine in bed upon therapist arrival. Pleasant and agreeable to therapy. See above for LOF for all tasks. Pt retired to seated in chair at end of session with chair alarm activated and call light within reach.     JAVON Natarajan/JAM

## 2023-09-22 NOTE — PROGRESS NOTES
Dialysis Time Out  To be done by RN and tech or 2 RNs  Staff Names Wenceslaodomenico Loyolaanna. [address] [place] [address],[address]    [x]  Identity of the patient using 2 patient identifiers  [x]  Consent for treatment  [x]  Equipment-proper machine and dialyzer  [x]  B-Hep B status  [x]  Orders- to include bath, blood flow, dialyzer, time and fluid removal  [x]  Access-Correct site and in working order  [x]  Time for patient to ask questions.

## 2023-09-22 NOTE — PLAN OF CARE
Discussed with patient and POA. Ok for tunneled cath placement on Monday. Discussed with nephro attending and discussed with nurse. Patient is ok for diet unless she is planned for another procedure today.       Matt Lester MD  Internal medicine, PGY-3  [facility]   9/22/2023 1:57 PM

## 2023-09-22 NOTE — PROGRESS NOTES
IR consulted to convert temporary dialysis catheter to tunneled dialysis catheter. Patient POA called to get consent due to patient confusion. POA reports that she is concerned about patient's quality of life and would like to hold off on tunneled catheter at this time. It was explained that having a non-tunneled catheter puts patient at increased risk of line sepsis. Pt's POA is aware of the risk and does not want catheter at this time. Pt POA encouraged to discuss concerns with primary team moving forward. IR willing to place tunneled catheter Monday morning if POA is agreeable to placement or pt becomes consentable.

## 2023-09-22 NOTE — PLAN OF CARE
Problem: Discharge Planning  Goal: Discharge to home or other facility with appropriate resources  9/22/2023 0912 by Eileen Segal RN  Outcome: Progressing  9/21/2023 2304 by Bert Barger RN  Outcome: Progressing  Flowsheets (Taken 9/21/2023 2000)  Discharge to home or other facility with appropriate resources:   Identify barriers to discharge with patient and caregiver   Arrange for needed discharge resources and transportation as appropriate   Identify discharge learning needs (meds, wound care, etc)   Refer to discharge planning if patient needs post-hospital services based on physician order or complex needs related to functional status, cognitive ability or social support system     Problem: Safety - Adult  Goal: Free from fall injury  9/22/2023 0912 by Eileen Segal RN  Outcome: Progressing  9/21/2023 2304 by Bert Barger RN  Outcome: Progressing  Flowsheets (Taken 9/21/2023 2000)  Free From Fall Injury:   Instruct family/caregiver on patient safety   Based on caregiver fall risk screen, instruct family/caregiver to ask for assistance with transferring infant if caregiver noted to have fall risk factors     Problem: ABCDS Injury Assessment  Goal: Absence of physical injury  9/22/2023 0912 by Eileen Segal RN  Outcome: Progressing  9/21/2023 2304 by Bert Barger RN  Outcome: Progressing  Flowsheets (Taken 9/21/2023 2000)  Absence of Physical Injury: Implement safety measures based on patient assessment     Problem: Skin/Tissue Integrity  Goal: Absence of new skin breakdown  Description: 1. Monitor for areas of redness and/or skin breakdown  2. Assess vascular access sites hourly  3. Every 4-6 hours minimum:  Change oxygen saturation probe site  4. Every 4-6 hours:  If on nasal continuous positive airway pressure, respiratory therapy assess nares and determine need for appliance change or resting period.   9/22/2023 0912 by Eileen Segal RN  Outcome: Progressing  9/21/2023 2304 by Bert Barger symptoms of electrolyte imbalances     Problem: Pain  Goal: Verbalizes/displays adequate comfort level or baseline comfort level  9/22/2023 0912 by Tiffany Clarke RN  Outcome: Progressing  9/21/2023 2304 by Leander Campbell RN  Outcome: Progressing  Flowsheets (Taken 9/21/2023 1937)  Verbalizes/displays adequate comfort level or baseline comfort level:   Encourage patient to monitor pain and request assistance   Assess pain using appropriate pain scale   Administer analgesics based on type and severity of pain and evaluate response   Implement non-pharmacological measures as appropriate and evaluate response   Notify Licensed Independent Practitioner if interventions unsuccessful or patient reports new pain

## 2023-09-22 NOTE — CARE COORDINATION
Patient to get tunneled cath on Monday. Several referrals have been sent and called. They are asking for updated once dialysis has been established and closer to discharge. Patient may need in house dialysis d/t confusion and stretcher transport.

## 2023-09-22 NOTE — PROGRESS NOTES
Physical Therapy  Facility/Department: [facility]  Physical Therapy daily treatment note    Name: Guanakito Allen  : 1934  MRN: [id number]  Date of Service: 2023    Discharge Recommendations:  Patient would benefit from continued therapy after discharge   PT Equipment Recommendations  Equipment Needed: No      Patient Diagnosis(es): The encounter diagnosis was Edema, unspecified type. Past Medical History:  has a past medical history of Hx of blood clots, Hyperlipidemia, Hypertension, Pulmonary embolism ([address]), and Thyroid disease. Past Surgical History:  has a past surgical history that includes IR NONTUNNELED VASCULAR CATHETER > 5 YEARS (2023) and ERCP (N/A, 2023). Assessment   Body Structures, Functions, Activity Limitations Requiring Skilled Therapeutic Intervention: Decreased functional mobility ; Decreased strength;Decreased balance;Decreased cognition;Decreased endurance;Decreased posture  Assessment: Pt amb 30 ft with RW and MIN A. PT high fal risk and unsafe to return to prior l iving without 24 hour assist. Recommend contiuned PT afte d/c to address deficis  Therapy Prognosis: Good  Activity Tolerance  Activity Tolerance: Patient tolerated treatment well;Patient limited by endurance     Plan   Physcial Therapy Plan  General Plan:  (5-6x)  Current Treatment Recommendations: Strengthening, ROM, Balance training, Endurance training, Safety education & training, Patient/Caregiver education & training, Therapeutic activities, Functional mobility training, Transfer training, Gait training, Stair training, Equipment evaluation, education, & procurement, Home exercise program, Neuromuscular re-education, Positioning  Safety Devices  Type of Devices:  All fall risk precautions in place, Call light within reach, Chair alarm in place, Gait belt, Nurse notified, Left in chair  Restraints  Restraints Initially in Place: No Restrictions  Restrictions/Precautions  Restrictions/Precautions: Fall Risk, Up as Tolerated  Required Braces or Orthoses?: No     Subjective   General  Patient assessed for rehabilitation services?: Yes  Response To Previous Treatment: Patient with no complaints from previous session. Family / Caregiver Present: Yes  Follows Commands: Within Functional Limits  Subjective  Subjective: Pt resting in bed upon arrival, agreeable to PT, pleasant and coopeartive, denies pain at rest       Cognition   Orientation  Overall Orientation Status: Impaired  Orientation Level: Oriented to place;Oriented to person;Oriented to time;Disoriented to situation     Objective      Bed mobility  Rolling to Right: Stand by assistance  Supine to Sit: Stand by assistance  Scooting: Stand by assistance  Bed Mobility Comments: cues for hand placement and sequencng with poor carryover  Transfers  Sit to Stand: Minimal Assistance  Stand to Sit: Minimal Assistance  Bed to Chair: Minimal assistance  Comment: VC's required for proper hand placement with fair return  Ambulation  Surface: Level tile  Device: Rolling Walker  Assistance: Minimal assistance  Quality of Gait: Narros HARISH, shuffling gait, fear of fallling  Distance: 30'  Comments: Pt fatigues quickly with progression of ambulation distance. MIN A for walker negotiation  More Ambulation?: No     Balance  Posture: Fair  Sitting - Static: Good  Sitting - Dynamic: Good  Standing - Static: Fair  Standing - Dynamic: Fair;-  Comments: assessed with RW     Exercise  Seated LE exercise program: Long Arc Quads, hip abduction/adduction, heel/toe raises, and marches.  Reps: 15x      AM-PAC Score  AM-PAC Inpatient Mobility Raw Score : 16 (09/22/23 1402)  AM-PAC Inpatient T-Scale Score : 40.78 (09/22/23 1402)  Mobility Inpatient CMS 0-100% Score: 54.16 (09/22/23 1402)  Mobility Inpatient CMS G-Code Modifier : CK (09/22/23 1402)        Goals  Short Term Goals  Time Frame for Short Term Goals:

## 2023-09-22 NOTE — CARE COORDINATION
HD plan is SNF w/on-site vs. OP HD. If OP HD, chair secured at [facility] MWF 5:15am. Clinic updated. SNF referrals to [facility] and [facility] pending.

## 2023-09-22 NOTE — PROGRESS NOTES
_                         Today's Date: 9/22/2023  Patient Name: Tito Li  Date of admission: 9/17/2023 12:17 PM  Patient's age: 80 y.o., 1934  Admission Dx: Acute kidney injury ([address]) [N17.9]  DARIUSZ (acute kidney injury) ([address]) [N17.9]      Requesting Physician: Tito Niño MD    CHIEF COMPLAINT: Altered mental status. DARIUSZ. Dehydration. Consult for bone lytic lesions of the skull. SUBJECTIVE:  Patient was seen and examined. Patient is clinically stable. No new events. She had EGD. She had multiple gallbladder stones. No significant findings. No active bleeding. No fever. BRIEF CASE HISTORY:    The patient is a 80 y.o.  female who is admitted to the hospital for further management of altered mental status and DARIUSZ and dehydration. The patient had 2 weeks history of increasing weakness and fatigue and decreased oral intake. She had altered mental status on admission but it has improved later on. She was found to have DARIUSZ and she had dialysis. Mentally she has improved significantly. Further work-up with imaging of the head CT scan showed lytic lesions in the frontal skull. No brain lesions were noted. Further lab work-up showed elevated kappa light chain immunoglobulin at 7168. We have been consulted for further management. Patient is awake and alert and she has no complaints at the present time. No bone aches. No fever. No active bleeding. Past Medical History:   has a past medical history of Hx of blood clots, Hyperlipidemia, Hypertension, Pulmonary embolism ([address]), and Thyroid disease. Past Surgical History:   has a past surgical history that includes IR NONTUNNELED VASCULAR CATHETER > 5 YEARS (9/18/2023) and ERCP (N/A, 9/20/2023). Family History: family history is not on file. Social History:   reports that she has never smoked.  She has never used smokeless tobacco. She reports that she does not currently injection 5-40 mL  5-40 mL IntraVENous PRN Chloé Powers MD        ondansetron (ZOFRAN-ODT) disintegrating tablet 4 mg  4 mg Oral Q8H PRN Chloé Powers MD        Or    ondansetron (ZOFRAN) injection 4 mg  4 mg IntraVENous Q6H PRN Chloé Powers MD        acetaminophen (TYLENOL) suppository 650 mg  650 mg Rectal Q6H PRN Chloé Powers MD   650 mg at 09/20/23 2324    insulin lispro (HUMALOG) injection vial 0-8 Units  0-8 Units SubCUTAneous TID WC Chloé Powers MD        insulin lispro (HUMALOG) injection vial 0-4 Units  0-4 Units SubCUTAneous Nightly Chloé Powers MD        glucose chewable tablet 16 g  4 tablet Oral PRN Chloé Powers MD        dextrose bolus 10% 125 mL  125 mL IntraVENous PRN Chloé Powers MD        Or    dextrose bolus 10% 250 mL  250 mL IntraVENous PRN Chloé Powers MD        glucagon (rDNA) injection 1 mg  1 mg IntraMUSCular PRN Chloé Powers MD        dextrose 10 % infusion   IntraVENous Continuous PRN Chloé Powers MD        levothyroxine (SYNTHROID) tablet 100 mcg  100 mcg Oral Daily Chloé Powers MD   100 mcg at 09/22/23 0746    hydrALAZINE (APRESOLINE) injection 10 mg  10 mg IntraVENous Q6H PRN Chloé Powers MD   10 mg at 09/20/23 0037    heparin (porcine) injection 4,000 Units  4,000 Units IntraVENous PRN Chloé Powers MD        heparin (porcine) injection 2,000 Units  2,000 Units IntraVENous PRN Chloé Powers MD   2,000 Units at 09/18/23 0025    amLODIPine (NORVASC) tablet 10 mg  10 mg Oral Daily Chloé Powers MD   10 mg at 09/22/23 0746       Allergies:  Penicillins, Dilaudid [hydromorphone hcl], and Meloxicam    REVIEW OF SYSTEMS:      General: Positive for weakness and fatigue. Positive for decreased appetite. No fever or chills. Eyes: No blurred vision, eye pain or double vision. Ears: No hearing problems or drainage. No tinnitus. Throat: No sore throat, problems with swallowing or dysphagia. Respiratory: No cough, sputum or hemoptysis.  No

## 2023-09-22 NOTE — PLAN OF CARE
Problem: Discharge Planning  Goal: Discharge to home or other facility with appropriate resources  Outcome: Progressing  Flowsheets (Taken 9/21/2023 2000)  Discharge to home or other facility with appropriate resources:   Identify barriers to discharge with patient and caregiver   Arrange for needed discharge resources and transportation as appropriate   Identify discharge learning needs (meds, wound care, etc)   Refer to discharge planning if patient needs post-hospital services based on physician order or complex needs related to functional status, cognitive ability or social support system     Problem: Safety - Adult  Goal: Free from fall injury  Outcome: Progressing  Flowsheets (Taken 9/21/2023 2000)  Free From Fall Injury:   Instruct family/caregiver on patient safety   Based on caregiver fall risk screen, instruct family/caregiver to ask for assistance with transferring infant if caregiver noted to have fall risk factors     Problem: ABCDS Injury Assessment  Goal: Absence of physical injury  Outcome: Progressing  Flowsheets (Taken 9/21/2023 2000)  Absence of Physical Injury: Implement safety measures based on patient assessment     Problem: Skin/Tissue Integrity  Goal: Absence of new skin breakdown  Description: 1. Monitor for areas of redness and/or skin breakdown  2. Assess vascular access sites hourly  3. Every 4-6 hours minimum:  Change oxygen saturation probe site  4. Every 4-6 hours:  If on nasal continuous positive airway pressure, respiratory therapy assess nares and determine need for appliance change or resting period.   Outcome: Progressing     Problem: Neurosensory - Adult  Goal: Achieves stable or improved neurological status  Outcome: Progressing  Flowsheets (Taken 9/21/2023 2000)  Achieves stable or improved neurological status: Assess for and report changes in neurological status  Goal: Achieves maximal functionality and self care  Outcome: Progressing  Flowsheets (Taken 9/21/2023

## 2023-09-23 LAB
ANION GAP SERPL CALCULATED.3IONS-SCNC: 13 MMOL/L (ref 9–17)
BUN SERPL-MCNC: 29 MG/DL (ref 8–23)
CALCIUM SERPL-MCNC: 8.4 MG/DL (ref 8.6–10.4)
CHLORIDE SERPL-SCNC: 96 MMOL/L (ref 98–107)
CO2 SERPL-SCNC: 24 MMOL/L (ref 20–31)
CREAT SERPL-MCNC: 5 MG/DL (ref 0.5–0.9)
ERYTHROCYTE [DISTWIDTH] IN BLOOD BY AUTOMATED COUNT: 13.6 % (ref 11.8–14.4)
GFR SERPL CREATININE-BSD FRML MDRD: 8 ML/MIN/1.73M2
GLUCOSE BLD-MCNC: 108 MG/DL (ref 65–105)
GLUCOSE BLD-MCNC: 109 MG/DL (ref 65–105)
GLUCOSE BLD-MCNC: 112 MG/DL (ref 65–105)
GLUCOSE BLD-MCNC: 89 MG/DL (ref 65–105)
GLUCOSE SERPL-MCNC: 104 MG/DL (ref 70–99)
HCT VFR BLD AUTO: 30.6 % (ref 36.3–47.1)
HGB BLD-MCNC: 9.6 G/DL (ref 11.9–15.1)
MCH RBC QN AUTO: 33.9 PG (ref 25.2–33.5)
MCHC RBC AUTO-ENTMCNC: 31.4 G/DL (ref 28.4–34.8)
MCV RBC AUTO: 108.1 FL (ref 82.6–102.9)
NRBC BLD-RTO: 0 PER 100 WBC
PLATELET # BLD AUTO: 141 K/UL (ref 138–453)
PMV BLD AUTO: 11.5 FL (ref 8.1–13.5)
POTASSIUM SERPL-SCNC: 4.5 MMOL/L (ref 3.7–5.3)
RBC # BLD AUTO: 2.83 M/UL (ref 3.95–5.11)
SODIUM SERPL-SCNC: 133 MMOL/L (ref 135–144)
WBC OTHER # BLD: 6.9 K/UL (ref 3.5–11.3)

## 2023-09-23 PROCEDURE — 2580000003 HC RX 258: Performed by: INTERNAL MEDICINE

## 2023-09-23 PROCEDURE — 6370000000 HC RX 637 (ALT 250 FOR IP): Performed by: INTERNAL MEDICINE

## 2023-09-23 PROCEDURE — 82947 ASSAY GLUCOSE BLOOD QUANT: CPT

## 2023-09-23 PROCEDURE — 80048 BASIC METABOLIC PNL TOTAL CA: CPT

## 2023-09-23 PROCEDURE — 85027 COMPLETE CBC AUTOMATED: CPT

## 2023-09-23 PROCEDURE — 1200000000 HC SEMI PRIVATE

## 2023-09-23 PROCEDURE — 36415 COLL VENOUS BLD VENIPUNCTURE: CPT

## 2023-09-23 RX ADMIN — Medication 500000 UNITS: at 16:57

## 2023-09-23 RX ADMIN — ANTI-FUNGAL POWDER MICONAZOLE NITRATE TALC FREE: 1.42 POWDER TOPICAL at 08:45

## 2023-09-23 RX ADMIN — DOXYCYCLINE HYCLATE 100 MG: 100 TABLET ORAL at 08:39

## 2023-09-23 RX ADMIN — Medication 500000 UNITS: at 21:53

## 2023-09-23 RX ADMIN — LEVOTHYROXINE SODIUM 100 MCG: 100 TABLET ORAL at 08:39

## 2023-09-23 RX ADMIN — Medication 500000 UNITS: at 12:49

## 2023-09-23 RX ADMIN — DOXYCYCLINE HYCLATE 100 MG: 100 TABLET ORAL at 21:53

## 2023-09-23 RX ADMIN — Medication 500000 UNITS: at 08:40

## 2023-09-23 RX ADMIN — SODIUM CHLORIDE, PRESERVATIVE FREE 10 ML: 5 INJECTION INTRAVENOUS at 21:53

## 2023-09-23 RX ADMIN — AMLODIPINE BESYLATE 10 MG: 10 TABLET ORAL at 08:39

## 2023-09-23 RX ADMIN — Medication 1 CAPSULE: at 08:40

## 2023-09-23 RX ADMIN — SODIUM CHLORIDE, PRESERVATIVE FREE 10 ML: 5 INJECTION INTRAVENOUS at 08:40

## 2023-09-23 NOTE — PLAN OF CARE
Problem: Discharge Planning  Goal: Discharge to home or other facility with appropriate resources  Outcome: Progressing     Problem: Safety - Adult  Goal: Free from fall injury  Outcome: Progressing     Problem: ABCDS Injury Assessment  Goal: Absence of physical injury  Outcome: Progressing     Problem: Skin/Tissue Integrity  Goal: Absence of new skin breakdown  Description: 1. Monitor for areas of redness and/or skin breakdown  2. Assess vascular access sites hourly  3. Every 4-6 hours minimum:  Change oxygen saturation probe site  4. Every 4-6 hours:  If on nasal continuous positive airway pressure, respiratory therapy assess nares and determine need for appliance change or resting period.   Outcome: Progressing     Problem: Neurosensory - Adult  Goal: Achieves stable or improved neurological status  Outcome: Progressing  Goal: Achieves maximal functionality and self care  Outcome: Progressing     Problem: Respiratory - Adult  Goal: Achieves optimal ventilation and oxygenation  Outcome: Progressing     Problem: Cardiovascular - Adult  Goal: Maintains optimal cardiac output and hemodynamic stability  Outcome: Progressing  Goal: Absence of cardiac dysrhythmias or at baseline  Outcome: Progressing     Problem: Skin/Tissue Integrity - Adult  Goal: Skin integrity remains intact  Outcome: Progressing  Flowsheets (Taken 9/22/2023 2300)  Skin Integrity Remains Intact:   Monitor for areas of redness and/or skin breakdown   Assess vascular access sites hourly     Problem: Musculoskeletal - Adult  Goal: Return mobility to safest level of function  Outcome: Progressing  Goal: Return ADL status to a safe level of function  Outcome: Progressing     Problem: Genitourinary - Adult  Goal: Absence of urinary retention  Outcome: Progressing  Goal: Urinary catheter remains patent  Outcome: Progressing     Problem: Metabolic/Fluid and Electrolytes - Adult  Goal: Electrolytes maintained within normal limits  Outcome: Progressing

## 2023-09-23 NOTE — PLAN OF CARE
Problem: Discharge Planning  Goal: Discharge to home or other facility with appropriate resources  9/23/2023 1707 by Donya Shell RN  Outcome: Progressing  9/23/2023 0516 by Kim Winchester RN  Outcome: Progressing     Problem: Safety - Adult  Goal: Free from fall injury  9/23/2023 1707 by Donya Shell RN  Outcome: Progressing  9/23/2023 0516 by Kim Winchester RN  Outcome: Progressing     Problem: ABCDS Injury Assessment  Goal: Absence of physical injury  9/23/2023 1707 by Donya Shell RN  Outcome: Progressing  9/23/2023 0516 by Kim Winchester RN  Outcome: Progressing     Problem: Skin/Tissue Integrity  Goal: Absence of new skin breakdown  Description: 1. Monitor for areas of redness and/or skin breakdown  2. Assess vascular access sites hourly  3. Every 4-6 hours minimum:  Change oxygen saturation probe site  4. Every 4-6 hours:  If on nasal continuous positive airway pressure, respiratory therapy assess nares and determine need for appliance change or resting period.   9/23/2023 1707 by Donya Shell RN  Outcome: Progressing  9/23/2023 0516 by Kim Winchester RN  Outcome: Progressing     Problem: Neurosensory - Adult  Goal: Achieves stable or improved neurological status  9/23/2023 1707 by Donya Shell RN  Outcome: Progressing  9/23/2023 0516 by Kim Winchester RN  Outcome: Progressing  Goal: Achieves maximal functionality and self care  9/23/2023 1707 by Donya Shell RN  Outcome: Progressing  9/23/2023 0516 by Kim Winchester RN  Outcome: Progressing     Problem: Respiratory - Adult  Goal: Achieves optimal ventilation and oxygenation  9/23/2023 1707 by Donya Shell RN  Outcome: Progressing  9/23/2023 0516 by Kim Winchester RN  Outcome: Progressing     Problem: Cardiovascular - Adult  Goal: Maintains optimal cardiac output and hemodynamic stability  9/23/2023 1707 by Donya Shell RN  Outcome: Progressing  9/23/2023 0516 by

## 2023-09-23 NOTE — PROGRESS NOTES
Today's Date: 9/23/2023  Patient Name: Devonte Phillips  Date of admission: 9/17/2023 12:17 PM  Patient's age: 80 y.o., 1934  Admission Dx: Acute kidney injury ([address]) [N17.9]  DARIUSZ (acute kidney injury) ([address]) [N17.9]      Requesting Physician: Mary Lou Carlos MD    CHIEF COMPLAINT: Altered mental status. DARIUSZ. Dehydration. Consult for bone lytic lesions of the skull. SUBJECTIVE:  Patient was seen and examined. 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  female who is admitted to the hospital for further management of altered mental status and DARIUSZ and dehydration. The patient had 2 weeks history of increasing weakness and fatigue and decreased oral intake. She had altered mental status on admission but it has improved later on. She was found to have DARIUSZ and she had dialysis. Mentally she has improved significantly. Further work-up with imaging of the head CT scan showed lytic lesions in the frontal skull. No brain lesions were noted. Further lab work-up showed elevated kappa light chain immunoglobulin at 7168. We have been consulted for further management. Patient is awake and alert and she has no complaints at the present time. No bone aches. No fever. No active bleeding. Past Medical History:   has a past medical history of Hx of blood clots, Hyperlipidemia, Hypertension, Pulmonary embolism ([address]), and Thyroid disease.     Past Surgical History:   has a past surgical history that includes IR NONTUNNELED VASCULAR CATHETER > 5 YEARS (9/18/2023) and Choledocholithiasis [K80.50] 09/19/2023    Multiple myeloma not having achieved remission ([address]) [C90.00] 09/19/2023    Hyperkalemia [E87.5] 09/18/2023    Confusion [R41.0] 09/18/2023    Goals of care, counseling/discussion [Z71.89] 09/18/2023    Encounter for palliative care [Z51.5] 09/18/2023    Acute kidney injury ([address]) [N17.9] 09/17/2023    DARIUSZ (acute kidney injury) ([address]) [N17.9] 09/17/2023    Edema [R60.9] 09/17/2023    Duodenal ulcer [K26.9] 02/07/2018    Pulmonary embolism ([address]) [I26.99] 12/01/2017    Hypothyroidism [E03.9] 05/30/2013    Hypertension, essential [I10] 05/04/2012    Hyperlipidemia [E78.5] 05/04/2012     Multiple myeloma. Kappa light chain or IgG kappa multiple myeloma. 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 Renal insufficiency, at least in part related to multiple myeloma. RECOMMENDATIONS:  I reviewed the lab records and images and discussed with the patient. Findings with CT scan showing lytic bone lesions in the skull in addition to the very high kappa light chain immunoglobulin above 7000 are consistent with multiple myeloma. Patient's renal insufficiency is very likely to be related to the same underlying cause. 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       Cyn Barrera MD, MD  [facility]                            This note is created with the assistance of a speech recognition program.  While intending to generate a document that actually reflects the content of the visit, the document can still have some errors including those of syntax and sound a like substitutions which may escape proof reading. It such instances, actual meaning can be extrapolated by contextual diversion.

## 2023-09-23 NOTE — PROGRESS NOTES
results found for: \"SPECIAL\"  Lab Results   Component Value Date/Time    CULTURE NO GROWTH 5 DAYS 09/17/2023 05:05 PM       Radiology:  IR NONTUNNELED VASCULAR CATHETER > 5 YEARS    Result Date: 9/18/2023  Successful ultrasound and fluoroscopy guided non-tunneled temporary hemodialysis catheter placement. Okay to use. CT HEAD WO CONTRAST    Result Date: 9/18/2023  1. No acute intracranial abnormality. 2. Mild chronic white matter microvascular ischemic changes. 3. Lytic lesions in the right frontal bone, squamous portion of the left temporal bone, and scattered throughout the calvarium concerning for metastasis or multiple myeloma until proven otherwise. CT CHEST ABDOMEN PELVIS WO CONTRAST Additional Contrast? None    Result Date: 9/18/2023  1. Choledocholithiasis with multiple stones in the mid to distal common bile duct and mild upstream dilatation. 2.  Cholelithiasis without CT evidence for acute cholecystitis or pancreatitis. 3.  Subtle asymmetric left perinephric stranding. No hydronephrosis. Clinical correlation is recommended as to the possibility of underlying urinary tract infection. 4.  Small pleural effusions. 5.  Diverticulosis. US RENAL LIMITED    Result Date: 9/18/2023  No hydronephrosis. Mildly echogenic kidneys suggestive of intrinsic renal parenchymal disease. XR CHEST PORTABLE    Result Date: 9/17/2023  1. Pulmonary vascular congestion and mild to moderate cardiomegaly. Minimal patchy airspace opacities in the mid to basilar lungs could represent edema, pneumonia, or scarring. 2. Widening of the bilateral paratracheal stripes most likely due to mediastinal fat, vessels, or intrathoracic goiter. However, lymphadenopathy or an anterior mediastinal mass could result in a similar appearance.        Physical Examination:        General appearance:  alert, cooperative and no distress  Mental Status:  oriented to person, place and time and normal affect  Lungs:  clear to auscultation

## 2023-09-24 LAB
ANION GAP SERPL CALCULATED.3IONS-SCNC: 14 MMOL/L (ref 9–17)
BUN SERPL-MCNC: 48 MG/DL (ref 8–23)
CALCIUM SERPL-MCNC: 8.7 MG/DL (ref 8.6–10.4)
CHLORIDE SERPL-SCNC: 95 MMOL/L (ref 98–107)
CO2 SERPL-SCNC: 25 MMOL/L (ref 20–31)
CREAT SERPL-MCNC: 7.1 MG/DL (ref 0.5–0.9)
GFR SERPL CREATININE-BSD FRML MDRD: 5 ML/MIN/1.73M2
GLUCOSE BLD-MCNC: 102 MG/DL (ref 65–105)
GLUCOSE BLD-MCNC: 128 MG/DL (ref 65–105)
GLUCOSE BLD-MCNC: 137 MG/DL (ref 65–105)
GLUCOSE BLD-MCNC: 91 MG/DL (ref 65–105)
GLUCOSE SERPL-MCNC: 94 MG/DL (ref 70–99)
POTASSIUM SERPL-SCNC: 4.2 MMOL/L (ref 3.7–5.3)
SODIUM SERPL-SCNC: 134 MMOL/L (ref 135–144)

## 2023-09-24 PROCEDURE — 80048 BASIC METABOLIC PNL TOTAL CA: CPT

## 2023-09-24 PROCEDURE — 97116 GAIT TRAINING THERAPY: CPT

## 2023-09-24 PROCEDURE — 2580000003 HC RX 258: Performed by: INTERNAL MEDICINE

## 2023-09-24 PROCEDURE — 97535 SELF CARE MNGMENT TRAINING: CPT

## 2023-09-24 PROCEDURE — 6370000000 HC RX 637 (ALT 250 FOR IP): Performed by: INTERNAL MEDICINE

## 2023-09-24 PROCEDURE — 1200000000 HC SEMI PRIVATE

## 2023-09-24 PROCEDURE — 82947 ASSAY GLUCOSE BLOOD QUANT: CPT

## 2023-09-24 PROCEDURE — 97110 THERAPEUTIC EXERCISES: CPT

## 2023-09-24 PROCEDURE — 36415 COLL VENOUS BLD VENIPUNCTURE: CPT

## 2023-09-24 RX ORDER — FLUCONAZOLE 200 MG/1
200 TABLET ORAL DAILY
Status: DISCONTINUED | OUTPATIENT
Start: 2023-09-24 | End: 2023-09-29 | Stop reason: HOSPADM

## 2023-09-24 RX ADMIN — AMLODIPINE BESYLATE 10 MG: 10 TABLET ORAL at 10:06

## 2023-09-24 RX ADMIN — LEVOTHYROXINE SODIUM 100 MCG: 100 TABLET ORAL at 10:06

## 2023-09-24 RX ADMIN — Medication 1 CAPSULE: at 10:06

## 2023-09-24 RX ADMIN — FLUCONAZOLE 200 MG: 200 TABLET ORAL at 13:46

## 2023-09-24 RX ADMIN — ANTI-FUNGAL POWDER MICONAZOLE NITRATE TALC FREE: 1.42 POWDER TOPICAL at 10:17

## 2023-09-24 RX ADMIN — DOXYCYCLINE HYCLATE 100 MG: 100 TABLET ORAL at 21:29

## 2023-09-24 RX ADMIN — Medication 500000 UNITS: at 18:46

## 2023-09-24 RX ADMIN — DOXYCYCLINE HYCLATE 100 MG: 100 TABLET ORAL at 10:06

## 2023-09-24 RX ADMIN — Medication 500000 UNITS: at 21:29

## 2023-09-24 RX ADMIN — SODIUM CHLORIDE, PRESERVATIVE FREE 10 ML: 5 INJECTION INTRAVENOUS at 10:17

## 2023-09-24 RX ADMIN — Medication 500000 UNITS: at 10:06

## 2023-09-24 RX ADMIN — SODIUM CHLORIDE, PRESERVATIVE FREE 5 ML: 5 INJECTION INTRAVENOUS at 21:59

## 2023-09-24 RX ADMIN — Medication 500000 UNITS: at 13:46

## 2023-09-24 NOTE — PROGRESS NOTES
Occupational Therapy  Facility/Department: [facility]  Occupational Therapy Daily Treatment Note      Name: Julian Hanson  : 1934  MRN: [id number]  Date of Service: 2023    Discharge Recommendations:  Patient would benefit from continued therapy after discharge          Patient Diagnosis(es): The encounter diagnosis was Edema, unspecified type. Past Medical History:  has a past medical history of Hx of blood clots, Hyperlipidemia, Hypertension, Pulmonary embolism ([address]), and Thyroid disease. Past Surgical History:  has a past surgical history that includes IR NONTUNNELED VASCULAR CATHETER > 5 YEARS (2023) and ERCP (N/A, 2023). Treatment Diagnosis: DARIUSZ      Assessment   Performance deficits / Impairments: Decreased functional mobility ; Decreased ADL status; Decreased strength;Decreased safe awareness;Decreased cognition;Decreased endurance;Decreased high-level IADLs  Assessment: Pt would benefit from continued acute care and post acute care OT to address above listed deficits. Treatment Diagnosis: DARIUSZ  Prognosis: Good  Activity Tolerance  Activity Tolerance: Patient Tolerated treatment well  Activity Tolerance Comments: Pt followed all commands with increased time and occassional cueing with good return. Plan   Occupational Therapy Plan  Times Per Week: 3-5 x/wk  Current Treatment Recommendations: Strengthening, Balance training, Functional mobility training, Endurance training, Cognitive reorientation, Safety education & training, Patient/Caregiver education & training, Equipment evaluation, education, & procurement, Self-Care / ADL, Home management training     Restrictions  Restrictions/Precautions  Restrictions/Precautions: Fall Risk, Up as Tolerated  Required Braces or Orthoses?: No    Subjective   General  Patient assessed for rehabilitation services?: Yes  Family / Caregiver Present: No  General Comment  Comments: RN ok'd pt for OT this date.  Pt agreed to session and Modifier : CK (09/24/23 6867)       Goals  Short Term Goals  Time Frame for Short Term Goals: By discharge, pt will:  Short Term Goal 1: Demo SUP for functional transfers and functional mobility with use of LRAD for engagement in ADLs/IADLs  Short Term Goal 2: Follow 50% of 2-step commands with appropriate initiation and sequencing for improved participation in daily occupations  Short Term Goal 3: Demo Mod I for UB ADLs following setup  Short Term Goal 4: Demo SBA for LB ADLs and toileting tasks with setup and AE PRN  Short Term Goal 5: Demo 8 min dynamic standing and reaching outside HARISH with unilateral UE release and SBA to increase standing balance/tolerance for ADL/IADL participation       Therapy Time   Individual Concurrent Group Co-treatment   Time In 1545         Time Out 1630         Minutes 45         Timed Code Treatment Minutes:  [address], ALEJANDRO/L

## 2023-09-24 NOTE — PROGRESS NOTES
Physical Therapy  Facility/Department: [facility]  Physical Therapy Daily Treatment Note    Name: Saad Shen  : 1934  MRN: [id number]  Date of Service: 2023    Discharge Recommendations:  Patient would benefit from continued therapy after discharge   PT Equipment Recommendations  Equipment Needed: No      Patient Diagnosis(es): The encounter diagnosis was Edema, unspecified type. Past Medical History:  has a past medical history of Hx of blood clots, Hyperlipidemia, Hypertension, Pulmonary embolism ([address]), and Thyroid disease. Past Surgical History:  has a past surgical history that includes IR NONTUNNELED VASCULAR CATHETER > 5 YEARS (2023) and ERCP (N/A, 2023). Assessment   Body Structures, Functions, Activity Limitations Requiring Skilled Therapeutic Intervention: Decreased functional mobility ; Decreased strength;Decreased balance;Decreased cognition;Decreased endurance;Decreased posture  Assessment: Pt ambulated 20ft x2 with RW and Rell, most limited by decreased strength and endurance. Recommending continued PT to address deficits as pt is currently a fall risk. Therapy Prognosis: Good  Requires PT Follow-Up: Yes  Activity Tolerance  Activity Tolerance: Patient tolerated treatment well;Patient limited by endurance     Plan   Physcial Therapy Plan  General Plan:  (5-6x/week)  Current Treatment Recommendations: Strengthening, ROM, Balance training, Endurance training, Safety education & training, Patient/Caregiver education & training, Therapeutic activities, Functional mobility training, Transfer training, Gait training, Stair training, Equipment evaluation, education, & procurement, Home exercise program, Neuromuscular re-education, Positioning  Safety Devices  Type of Devices:  All fall risk precautions in place, Call light within reach, Gait belt, Nurse notified, Left in bed, Bed alarm in place, Patient at risk for falls  Restraints  Restraints Initially in Place: No

## 2023-09-24 NOTE — CARE COORDINATION
Transitional planning. Plans for Tunnel Cath placement 9/25, Referrals to SNFs, PT/OT ordered. OP HD, chair secured at [facility] MWF 5:15am.     Called PT and OT requesting that pt be seen today for notes needed for SNF placment.      1500 LM for Myron Miles with [address], requesting CB to CM concerning referral.

## 2023-09-24 NOTE — PLAN OF CARE
Problem: Discharge Planning  Goal: Discharge to home or other facility with appropriate resources  9/24/2023 0035 by Shaniqua Tafoya RN  Outcome: Progressing  9/23/2023 1707 by Arjun Mojica RN  Outcome: Progressing     Problem: Safety - Adult  Goal: Free from fall injury  9/24/2023 0035 by Shaniqua Tafoya RN  Outcome: Progressing  9/23/2023 1707 by Arjun Mojica RN  Outcome: Progressing     Problem: ABCDS Injury Assessment  Goal: Absence of physical injury  9/24/2023 0035 by Shaniqua Tafoya RN  Outcome: Progressing  9/23/2023 1707 by Arjun Mojica RN  Outcome: Progressing     Problem: Skin/Tissue Integrity  Goal: Absence of new skin breakdown  Description: 1. Monitor for areas of redness and/or skin breakdown  2. Assess vascular access sites hourly  3. Every 4-6 hours minimum:  Change oxygen saturation probe site  4. Every 4-6 hours:  If on nasal continuous positive airway pressure, respiratory therapy assess nares and determine need for appliance change or resting period.   9/24/2023 0035 by Shaniqua Tafoya RN  Outcome: Progressing  9/23/2023 1707 by Arjun Mojica RN  Outcome: Progressing     Problem: Neurosensory - Adult  Goal: Achieves stable or improved neurological status  9/24/2023 0035 by Shaniqua Tafoya RN  Outcome: Progressing  9/23/2023 1707 by Arjun Mojica RN  Outcome: Progressing  Goal: Achieves maximal functionality and self care  9/24/2023 0035 by Shaniqua Tafoya RN  Outcome: Progressing  9/23/2023 1707 by Arjun Mojica RN  Outcome: Progressing     Problem: Respiratory - Adult  Goal: Achieves optimal ventilation and oxygenation  9/24/2023 0035 by Shaniqua Tafoya RN  Outcome: Progressing  9/23/2023 1707 by Arjun Mojica RN  Outcome: Progressing     Problem: Cardiovascular - Adult  Goal: Maintains optimal cardiac output and hemodynamic stability  9/24/2023 0035 by Shaniqua Tafoya RN  Outcome: Progressing  9/23/2023 1707 by Mario Schafer

## 2023-09-24 NOTE — PROGRESS NOTES
[facility]  Office: [phone]  Aunbonnie Joy, DO, Delflavios Papo Blood, DO, Jose Suh MD, Angela Andrade MD, Artur Sanderson MD, Yandel Garcia MD,  Kenny Peacock MD, Ty Clark MD, Nic George, DO, Stephane Arias MD,  Elba Delgado, DO, William Gee MD, Anny Ross MD, Joan Shoulder, DO, Rin Kaufman MD,  Howard Pickens DO, Romelia Prince MD, Michael Ashley MD, Bairon Garcia MD, Michael Garcia MD,  Ji Naik MD, Cynthia Hansen MD, Yecenia Mckeon MD, Fatmata Ortiz MD, Adelso Rothman DO, Lexx Mckeon MD,  José Manuel Carr MD, Ariadna Torres MD, Henri Matute, CNP,  Christa Downey, CNP,, Mechelle Brock, CNP,  Adelina Tucker, [facility], Joseph Thakur, CNP, Dimitrios Christianson, CNP, Alexsander Rocha CNP, Rafa Hogue, CNP, Kristofer Russell, CNP, Naresh Michele, CNP, Jennie Becerril, CNS, Irene Marie, CNP, Gisele Camarillo, [address]    Progress Note    9/24/2023    9:56 AM    Name:   Ariadna Torres  MRN:     [id number]     Acct:      [de-identified]   Room:   [address] [place] [address] Day:  7  Admit Date:  9/17/2023 12:17 PM    PCP:   Moustapha Andersen MD  Code Status:  Full Code    Subjective:     C/C: fatigue, DARIUSZ    Interval History Status: not changed. No issues overnight  Still having fatigue  Awaiting tunneled cath placement  No other complaints     Brief History:     Pt is an 81 yo F w/ PMHx of HTN started on atenolol & amlodipine, PE x2 started on Eliquis, dyslipidemia, & hypothyroidism who was transferred to us from [facility]. She presented to the ER w/ 2wk hx of weakness & lethargy. Pt was found to be in acute kidney injury w/ Cr of 9 & K+ of more than 6. She was transferred  here for further evaluation. Pt says that she has not been feeling well for the last 2 weeks. She just did not have an appetite & was not eating or drinking well. Denies chest pain.

## 2023-09-24 NOTE — PROGRESS NOTES
Assessment: Writer responded to patient request for communion and anointing of the sick. Patient was awake and oriented when writer entered the room. A family of three was in the room. They were patient's friends. When asked how she was feeling, patient responded; \"I am feeling better but they don't allow me to sleep at night. People keep on waking me up for one thing or the other. \" Patient said she was raised [employer] and a [facility]. Patient received sacraments of anointing of the sick and communion. Intervention: Writer provided ministry of presence, offered support and prayed with patient and her friends. Outcome: Patient and her friends expressed appreciation for the spiritual and emotional support they received. Plan: Follow up visits recommended for more prayers and support.

## 2023-09-25 ENCOUNTER — APPOINTMENT (OUTPATIENT)
Dept: INTERVENTIONAL RADIOLOGY/VASCULAR | Age: 88
DRG: 840 | End: 2023-09-25
Attending: INTERNAL MEDICINE
Payer: MEDICARE

## 2023-09-25 ENCOUNTER — APPOINTMENT (OUTPATIENT)
Dept: DIALYSIS | Age: 88
DRG: 840 | End: 2023-09-25
Attending: INTERNAL MEDICINE
Payer: MEDICARE

## 2023-09-25 LAB
ANION GAP SERPL CALCULATED.3IONS-SCNC: 15 MMOL/L (ref 9–16)
BUN SERPL-MCNC: 62 MG/DL (ref 8–23)
CALCIUM SERPL-MCNC: 8.6 MG/DL (ref 8.6–10.4)
CHLORIDE SERPL-SCNC: 96 MMOL/L (ref 98–107)
CO2 SERPL-SCNC: 22 MMOL/L (ref 20–31)
CREAT SERPL-MCNC: 8.4 MG/DL (ref 0.5–0.9)
ERYTHROCYTE [DISTWIDTH] IN BLOOD BY AUTOMATED COUNT: 13.6 % (ref 11.8–14.4)
GFR SERPL CREATININE-BSD FRML MDRD: 4 ML/MIN/1.73M2
GLUCOSE BLD-MCNC: 106 MG/DL (ref 65–105)
GLUCOSE BLD-MCNC: 95 MG/DL (ref 65–105)
GLUCOSE BLD-MCNC: 99 MG/DL (ref 65–105)
GLUCOSE SERPL-MCNC: 93 MG/DL (ref 74–99)
HCT VFR BLD AUTO: 31.6 % (ref 36.3–47.1)
HGB BLD-MCNC: 11 G/DL (ref 11.9–15.1)
MCH RBC QN AUTO: 36.1 PG (ref 25.2–33.5)
MCHC RBC AUTO-ENTMCNC: 34.8 G/DL (ref 28.4–34.8)
MCV RBC AUTO: 103.6 FL (ref 82.6–102.9)
NRBC BLD-RTO: 0 PER 100 WBC
PLATELET # BLD AUTO: 502 K/UL (ref 138–453)
PMV BLD AUTO: 11 FL (ref 8.1–13.5)
POTASSIUM SERPL-SCNC: 4.5 MMOL/L (ref 3.7–5.3)
RBC # BLD AUTO: 3.05 M/UL (ref 3.95–5.11)
SODIUM SERPL-SCNC: 133 MMOL/L (ref 136–145)
WBC OTHER # BLD: 6.1 K/UL (ref 3.5–11.3)

## 2023-09-25 PROCEDURE — 36558 INSERT TUNNELED CV CATH: CPT

## 2023-09-25 PROCEDURE — C1769 GUIDE WIRE: HCPCS

## 2023-09-25 PROCEDURE — 1200000000 HC SEMI PRIVATE

## 2023-09-25 PROCEDURE — 6370000000 HC RX 637 (ALT 250 FOR IP): Performed by: INTERNAL MEDICINE

## 2023-09-25 PROCEDURE — 05HM33Z INSERTION OF INFUSION DEVICE INTO RIGHT INTERNAL JUGULAR VEIN, PERCUTANEOUS APPROACH: ICD-10-PCS | Performed by: RADIOLOGY

## 2023-09-25 PROCEDURE — 77001 FLUOROGUIDE FOR VEIN DEVICE: CPT

## 2023-09-25 PROCEDURE — 90935 HEMODIALYSIS ONE EVALUATION: CPT

## 2023-09-25 PROCEDURE — 82947 ASSAY GLUCOSE BLOOD QUANT: CPT

## 2023-09-25 PROCEDURE — 85027 COMPLETE CBC AUTOMATED: CPT

## 2023-09-25 PROCEDURE — 6360000002 HC RX W HCPCS: Performed by: PHYSICIAN ASSISTANT

## 2023-09-25 PROCEDURE — 80048 BASIC METABOLIC PNL TOTAL CA: CPT

## 2023-09-25 PROCEDURE — 36415 COLL VENOUS BLD VENIPUNCTURE: CPT

## 2023-09-25 PROCEDURE — 2580000003 HC RX 258: Performed by: INTERNAL MEDICINE

## 2023-09-25 RX ORDER — AMLODIPINE BESYLATE 10 MG/1
10 TABLET ORAL DAILY
Qty: 30 TABLET | Refills: 3 | DISCHARGE
Start: 2023-09-25

## 2023-09-25 RX ORDER — HEPARIN SODIUM 1000 [USP'U]/ML
INJECTION, SOLUTION INTRAVENOUS; SUBCUTANEOUS PRN
Status: COMPLETED | OUTPATIENT
Start: 2023-09-25 | End: 2023-09-25

## 2023-09-25 RX ORDER — FLUCONAZOLE 200 MG/1
200 TABLET ORAL DAILY
Qty: 6 TABLET | Refills: 0 | Status: SHIPPED | OUTPATIENT
Start: 2023-09-25 | End: 2023-10-01

## 2023-09-25 RX ADMIN — SODIUM CHLORIDE, PRESERVATIVE FREE 10 ML: 5 INJECTION INTRAVENOUS at 21:33

## 2023-09-25 RX ADMIN — DOXYCYCLINE HYCLATE 100 MG: 100 TABLET ORAL at 13:03

## 2023-09-25 RX ADMIN — Medication 500000 UNITS: at 17:38

## 2023-09-25 RX ADMIN — HEPARIN SODIUM 1600 UNITS: 1000 INJECTION, SOLUTION INTRAVENOUS; SUBCUTANEOUS at 15:55

## 2023-09-25 RX ADMIN — Medication 500000 UNITS: at 13:03

## 2023-09-25 RX ADMIN — LEVOTHYROXINE SODIUM 100 MCG: 100 TABLET ORAL at 13:02

## 2023-09-25 RX ADMIN — FLUCONAZOLE 200 MG: 200 TABLET ORAL at 13:02

## 2023-09-25 NOTE — CARE COORDINATION
Bayron De Oliveira from Dialysis has set up patient with [facility] for a MWF 5:15 AM dialysis time. Will check with therapy to see if patient will be able to go by wheelchair. Resent referrals to SNF choices. Verified with Bayron De Oliveira in Dialysis that there have been no referrals sent to in house dialysis units. OP dialysis has been set up. Will verify with patient as to what she feels she will need at discharge ARU/SNF     1600 attempted to speak to the patient regarding transition Plan patient is off unit at this time . Will return as time allows. Called the following facilities  [address] - left message   [facility]- Left voicemail  [facility].

## 2023-09-25 NOTE — CARE COORDINATION
HD REFERRAL STATUS    Clinic: [facility]    Chair Time/Schedule: MWF 5:15am    Start Date: TBD, awaiting SNF placement    Financial Clearance: Cleared    Medical Clearance: Cleared    -Contacted Haritha Bryant to confirm schedule and start date. Clinic manager will follow up to confirm. [phone] with Donaldo Brown, chair being held for patient is 5:15am. Patient requesting later chair if possible. David will explore options. [phone]  Confirmed only time available for [facility] is 5:15am. Clinic will attempt to work towards later time with patient once established.

## 2023-09-25 NOTE — BRIEF OP NOTE
Brief Postoperative Note    [facility]  YOB: 1934  [id number]    Pre-operative Diagnosis: Acute Renal Failure      Post-operative Diagnosis: Same    Procedure: Tunneled Dialysis Catheter    Medication Given: none    Anesthesia: 1%Lidocaine     Surgeons/Assistants: ELLIS Fontanez     Estimated Blood Loss: Minimal    Complications: none    14 Fr x 19 cm tip to cuff palindrome tunneled HD Catheter placed successfully via the Site:  Right Internal Jugular Vein. Catheter secured to skin, dressing applied. Catheter locked with Heparin. May use HD cath for dialysis.     Electronically signed by ELLIS Pierson on 9/25/2023 at 3:57 PM

## 2023-09-25 NOTE — PROGRESS NOTES
Physical Therapy        Physical Therapy Cancel Note      DATE: 2023    NAME: Berna Blankenship  MRN: [id number]   : 1934      Patient not seen this date for Physical Therapy due to:    Hemodialysis:      Electronically signed by Ton Steve PT on 2023 at 10:34 AM

## 2023-09-25 NOTE — PROGRESS NOTES
[facility]  Office: [phone]  Jennifer Llanos Blood, DO, Shanda Sierra MD, Jorge Hernández MD, Marija Armenta MD, Gonsalo Henry MD,  Alisia Land MD, Tito Niño MD, Delcia Epley, DO, Ismael Marcus MD,  Peggy Magana DO, Aneta Loo MD, Epifanio Williamson MD, Jesusita Amaya, DO, Kim Orellana MD,  Charlotte Mccord DO, Aung Goldstein MD, Pilar Jason MD, Natali Mitchell MD, Cyrus Brown MD,  Lisa Cloud MD, Jhoana Kamara MD, Hilary Cuevas MD, Alda Quiroz MD, Roxanne Leahy DO, Mackenzie Fagan MD,  Juan Manuel Pablo MD, Tito Li MD, Daxa Murphy, CNP,  Mateo Roca, CNP,, Aileen Perez, CNP,  David Reynoso, BOBBI, Asad Muir, CNP, You Gonzalez, CNP, Quita Bustillo, CNP, Navi Quiroz, CNP, Saumya Gutierrez, CNP, Harpal Chen, CNP, Ernesto Llanos, CNS, Rodolfo Mejía, CNP, Maddi Langley, [facility] Decherdiliana Bates    Progress Note    9/25/2023    11:28 AM    Name:   Tito Li  MRN:     [id number]     Acct:      [de-identified]   Room:   [address] [place] [address] Day:  8  Admit Date:  9/17/2023 12:17 PM    PCP:   Karen Ruiz MD  Code Status:  Full Code    Subjective:     C/C: fatigue, DARIUSZ    Interval History Status: not changed. No issues overnight  Seen in HD  Awaiting tunneled cath placement  Awaiting SNF placement  Awaiting HD facility placement     Brief History:     Pt is an 79 yo F w/ PMHx of HTN started on atenolol & amlodipine, PE x2 started on Eliquis, dyslipidemia, & hypothyroidism who was transferred to us from [facility]. She presented to the ER w/ 2wk hx of weakness & lethargy. Pt was found to be in acute kidney injury w/ Cr of 9 & K+ of more than 6. She was transferred  here for further evaluation. Pt says that she has not been feeling well for the last 2 weeks.   She just did not have an appetite & was not eating or drinking Component Value Date/Time    CULTURE NO GROWTH 5 DAYS 09/17/2023 05:05 PM       Radiology:  IR NONTUNNELED VASCULAR CATHETER > 5 YEARS    Result Date: 9/18/2023  Successful ultrasound and fluoroscopy guided non-tunneled temporary hemodialysis catheter placement. Okay to use. CT HEAD WO CONTRAST    Result Date: 9/18/2023  1. No acute intracranial abnormality. 2. Mild chronic white matter microvascular ischemic changes. 3. Lytic lesions in the right frontal bone, squamous portion of the left temporal bone, and scattered throughout the calvarium concerning for metastasis or multiple myeloma until proven otherwise. CT CHEST ABDOMEN PELVIS WO CONTRAST Additional Contrast? None    Result Date: 9/18/2023  1. Choledocholithiasis with multiple stones in the mid to distal common bile duct and mild upstream dilatation. 2.  Cholelithiasis without CT evidence for acute cholecystitis or pancreatitis. 3.  Subtle asymmetric left perinephric stranding. No hydronephrosis. Clinical correlation is recommended as to the possibility of underlying urinary tract infection. 4.  Small pleural effusions. 5.  Diverticulosis. US RENAL LIMITED    Result Date: 9/18/2023  No hydronephrosis. Mildly echogenic kidneys suggestive of intrinsic renal parenchymal disease. XR CHEST PORTABLE    Result Date: 9/17/2023  1. Pulmonary vascular congestion and mild to moderate cardiomegaly. Minimal patchy airspace opacities in the mid to basilar lungs could represent edema, pneumonia, or scarring. 2. Widening of the bilateral paratracheal stripes most likely due to mediastinal fat, vessels, or intrathoracic goiter. However, lymphadenopathy or an anterior mediastinal mass could result in a similar appearance.        Physical Examination:        General appearance:  alert, cooperative and no distress  Mental Status:  oriented to person, place and time and normal affect  Lungs:  clear to auscultation bilaterally, normal effort  Heart: regular rate and rhythm  Abdomen:  soft, nontender, nondistended, normal bowel sounds  Extremities:  no edema, redness, tenderness in the calves  Skin:  no gross lesions, rashes, induration    Assessment:        Hospital Problems             Last Modified POA    * (Principal) Acute kidney injury ([address]) 9/17/2023 Yes    DARIUSZ (acute kidney injury) ([address]) 9/17/2023 Yes    Hypertension, essential 9/18/2023 Yes    Duodenal ulcer 9/17/2023 Yes    Hyperlipidemia 9/17/2023 Yes    Hypothyroidism 9/17/2023 Yes    Pulmonary embolism ([address]) 9/17/2023 Yes    Edema 9/17/2023 Yes    Hyperkalemia 9/18/2023 Yes    Confusion 9/18/2023 Yes    Goals of care, counseling/discussion 9/18/2023 Yes    Encounter for palliative care 9/18/2023 Yes    Choledocholithiasis 9/19/2023 Yes    Multiple myeloma not having achieved remission ([address]) 9/19/2023 Yes    Dependence on renal dialysis ([address]) 9/20/2023 Yes    Paraproteinemia 9/21/2023 Yes     Plan:        DARIUSZ secondary to multiple myeloma  - Nephrology following  - New start HD - further HD per nephrology  - Tunneled cath placement Monday/today      Oral Thrush  - Nystatin rinse  - Start PO fluconazole      Choledocholithiasis  - CT abd/pelvis w/o contrast showed multiple stones in CBD.  - GI consulted - S/p ERCP (9/20)     Multiple Myeloma  - CT head showed multiple lytic lesions  - Increased Kappa/Lambda ratio  - Pt refusing further testing & treatment. POA informed of diagnosis & decisions to be discussed between patient & POA.   - Hematology following     Hx of pulmonary embolism x2  - Resume home Eliquis when no procedures planned - on hold for catheter placement      Essential HTN  - Continue Norvasc. Atenolol discontinued d/t bradycardia.      Hypothyroidism  - Continue levothyroxine    Disposition - Tunneled cath today, DC planning after - awaiting SNF/HD placement     Alexsander Rodriguez MD  9/25/2023  11:28 AM

## 2023-09-25 NOTE — PLAN OF CARE
Problem: Discharge Planning  Goal: Discharge to home or other facility with appropriate resources  9/25/2023 0346 by Heather Howell RN  Outcome: Progressing  9/24/2023 1702 by Ethel Thomas RN  Outcome: Progressing     Problem: Safety - Adult  Goal: Free from fall injury  9/25/2023 0346 by Heather Howell RN  Outcome: Progressing  9/24/2023 1702 by Ethel Thomas RN  Outcome: Progressing     Problem: ABCDS Injury Assessment  Goal: Absence of physical injury  9/25/2023 0346 by Heather Howell RN  Outcome: Progressing  9/24/2023 1702 by Ethel Thomas RN  Outcome: Progressing     Problem: Skin/Tissue Integrity  Goal: Absence of new skin breakdown  Description: 1. Monitor for areas of redness and/or skin breakdown  2. Assess vascular access sites hourly  3. Every 4-6 hours minimum:  Change oxygen saturation probe site  4. Every 4-6 hours:  If on nasal continuous positive airway pressure, respiratory therapy assess nares and determine need for appliance change or resting period.   9/25/2023 0346 by Heather Howell RN  Outcome: Progressing  9/24/2023 1702 by Ethel Thomas RN  Outcome: Progressing     Problem: Neurosensory - Adult  Goal: Achieves stable or improved neurological status  9/25/2023 0346 by Heather Howell RN  Outcome: Progressing  9/24/2023 1702 by Ethel Thomas RN  Outcome: Progressing  Goal: Achieves maximal functionality and self care  9/25/2023 0346 by Heather Howell RN  Outcome: Progressing  9/24/2023 1702 by Ethel Thomas RN  Outcome: Progressing     Problem: Respiratory - Adult  Goal: Achieves optimal ventilation and oxygenation  9/25/2023 0346 by Heather Howell RN  Outcome: Progressing  9/24/2023 1702 by Ethel Thomas RN  Outcome: Progressing     Problem: Cardiovascular - Adult  Goal: Maintains optimal cardiac output and hemodynamic stability  9/25/2023 0346 by Heather Howell RN  Outcome: Progressing  9/24/2023 1702 by Priya Mercado

## 2023-09-25 NOTE — PROGRESS NOTES
Today's Date: 9/25/2023  Patient Name: Nate Merrill  Date of admission: 9/17/2023 12:17 PM  Patient's age: 80 y.o., 1934  Admission Dx: Acute kidney injury ([address]) [N17.9]  DARIUSZ (acute kidney injury) ([address]) [N17.9]      Requesting Physician: No admitting provider for patient encounter. CHIEF COMPLAINT: Altered mental status. DAIRUSZ. Dehydration. Consult for bone lytic lesions of the skull. SUBJECTIVE:  Patient was seen and examined. Had dialysis cath today   NO fever chills  NO NV    BRIEF CASE HISTORY:    The patient is a 80 y.o.  female who is admitted to the hospital for further management of altered mental status and DARIUSZ and dehydration. The patient had 2 weeks history of increasing weakness and fatigue and decreased oral intake. She had altered mental status on admission but it has improved later on. She was found to have DARIUSZ and she had dialysis. Mentally she has improved significantly. Further work-up with imaging of the head CT scan showed lytic lesions in the frontal skull. No brain lesions were noted. Further lab work-up showed elevated kappa light chain immunoglobulin at 7168. We have been consulted for further management. Patient is awake and alert and she has no complaints at the present time. No bone aches. No fever. No active bleeding. Past Medical History:   has a past medical history of Hx of blood clots, Hyperlipidemia, Hypertension, Pulmonary embolism ([address]), and Thyroid disease. Past Surgical History:   has a past surgical history that includes IR NONTUNNELED VASCULAR CATHETER > 5 YEARS (9/18/2023) and ERCP (N/A, 9/20/2023). Family History: family history is not on file. Social History:   reports that she has never smoked. She has never used smokeless tobacco. She reports that she does not currently use alcohol. She reports that she does not use drugs.    Medications:    Prior to Admission 09/17/2023    Edema [R60.9] 09/17/2023    Duodenal ulcer [K26.9] 02/07/2018    Pulmonary embolism ([address]) [I26.99] 12/01/2017    Hypothyroidism [E03.9] 05/30/2013    Hypertension, essential [I10] 05/04/2012    Hyperlipidemia [E78.5] 05/04/2012     Multiple myeloma. Kappa light chain or IgG kappa multiple myeloma. 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 Renal insufficiency, at least in part related to multiple myeloma. RECOMMENDATIONS:  I reviewed the lab records and images and discussed with the patient. Findings with CT scan showing lytic bone lesions in the skull in addition to the very high kappa light chain immunoglobulin above 7000 are consistent with multiple myeloma. 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 Mitul Conroy MD, MD  [facility]                            This note is created with the assistance of a speech recognition program.  While intending to generate a document that actually reflects the content of the visit, the document can still have some errors including those of syntax and sound a like substitutions which may escape proof reading. It such instances, actual meaning can be extrapolated by contextual diversion.

## 2023-09-25 NOTE — PROGRESS NOTES
Dialysis Time Out  To be done by RN and tech or 2 RNs  Staff Names [id number] RN     [x]  Identity of the patient using 2 patient identifiers  [x]  Consent for treatment  [x]  Equipment-proper machine and dialyzer  [x]  B-Hep B status  [x]  Orders- to include bath, blood flow, dialyzer, time and fluid removal  [x]  Access-Correct site and in working order  [x]  Time for patient to ask questions.

## 2023-09-25 NOTE — PROGRESS NOTES
Comprehensive Nutrition Assessment    Type and Reason for Visit:  Initial (length of stay)    Nutrition Recommendations/Plan:   Recommend removal of protein restriction while on dialysis. Liberalize sodium restriction to ZECHARIAH. Malnutrition Assessment:  Malnutrition Status:  Insufficient data (09/25/23 4160)    Context:  Acute Illness     Findings of the 6 clinical characteristics of malnutrition:  Energy Intake:  Mild decrease in energy intake (Comment)  Weight Loss:  Unable to assess     Body Fat Loss:  Unable to assess     Muscle Mass Loss:  Unable to assess    Fluid Accumulation:  Mild Extremities   Strength:  Not Performed    Nutrition Assessment:    Pt currently NPO for HD cath placement (new HD start this admission). Pt reports \"improving\" appetite. States appetite has been decreased since admission, but is unable to provide detailed hx of how she has been eating. Previously on renal diet with protein restrictions. Labs reviewed; K+ WNL, no phos recently obtained. Nutrition Related Findings:    meds/labs reviewed Wound Type: None       Current Nutrition Intake & Therapies:    Average Meal Intake: NPO  Average Supplements Intake: NPO  ADULT DIET; Regular; Low Sodium (2 gm); Low Potassium (Less than 3000 mg/day); Low Phosphorus (Less than 1000 mg); 60 to 80 gm - NPO for permacath placement    Anthropometric Measures:  Height: 5' 5\" (165.1 cm)  Ideal Body Weight (IBW): 125 lbs (57 kg)    Admission Body Weight: 166 lb 10.7 oz (75.6 kg)  Current Body Weight: 154 lb 5.2 oz (70 kg), 123.5 % IBW. Current BMI (kg/m2): 25.7  Usual Body Weight: 160 lb (72.6 kg)  % Weight Change (Calculated): -3.5                    BMI Categories: Overweight (BMI 25.0-29. 9)    Estimated Daily Nutrient Needs:  Energy Requirements Based On: Kcal/kg  Weight Used for Energy Requirements: Current  Energy (kcal/day): 1390-3764 kcals/day  Weight Used for Protein Requirements: Ideal  Protein (g/day): 75-80 gm/day  Method Used for

## 2023-09-26 ENCOUNTER — APPOINTMENT (OUTPATIENT)
Dept: DIALYSIS | Age: 88
DRG: 840 | End: 2023-09-26
Attending: INTERNAL MEDICINE
Payer: MEDICARE

## 2023-09-26 LAB
ANION GAP SERPL CALCULATED.3IONS-SCNC: 16 MMOL/L (ref 9–16)
BUN SERPL-MCNC: 59 MG/DL (ref 8–23)
CALCIUM SERPL-MCNC: 8.6 MG/DL (ref 8.6–10.4)
CHLORIDE SERPL-SCNC: 96 MMOL/L (ref 98–107)
CO2 SERPL-SCNC: 21 MMOL/L (ref 20–31)
CREAT SERPL-MCNC: 8.4 MG/DL (ref 0.5–0.9)
ERYTHROCYTE [DISTWIDTH] IN BLOOD BY AUTOMATED COUNT: 13.2 % (ref 11.8–14.4)
GFR SERPL CREATININE-BSD FRML MDRD: 4 ML/MIN/1.73M2
GLUCOSE BLD-MCNC: 134 MG/DL (ref 65–105)
GLUCOSE BLD-MCNC: 90 MG/DL (ref 65–105)
GLUCOSE SERPL-MCNC: 133 MG/DL (ref 74–99)
HCT VFR BLD AUTO: 30.5 % (ref 36.3–47.1)
HGB BLD-MCNC: 9.9 G/DL (ref 11.9–15.1)
MCH RBC QN AUTO: 34.1 PG (ref 25.2–33.5)
MCHC RBC AUTO-ENTMCNC: 32.5 G/DL (ref 28.4–34.8)
MCV RBC AUTO: 105.2 FL (ref 82.6–102.9)
NRBC BLD-RTO: 0 PER 100 WBC
PLATELET # BLD AUTO: 174 K/UL (ref 138–453)
PMV BLD AUTO: 11.4 FL (ref 8.1–13.5)
POTASSIUM SERPL-SCNC: 4.4 MMOL/L (ref 3.7–5.3)
RBC # BLD AUTO: 2.9 M/UL (ref 3.95–5.11)
SODIUM SERPL-SCNC: 133 MMOL/L (ref 136–145)
WBC OTHER # BLD: 7.3 K/UL (ref 3.5–11.3)

## 2023-09-26 PROCEDURE — 6370000000 HC RX 637 (ALT 250 FOR IP): Performed by: STUDENT IN AN ORGANIZED HEALTH CARE EDUCATION/TRAINING PROGRAM

## 2023-09-26 PROCEDURE — 6360000002 HC RX W HCPCS: Performed by: INTERNAL MEDICINE

## 2023-09-26 PROCEDURE — 82947 ASSAY GLUCOSE BLOOD QUANT: CPT

## 2023-09-26 PROCEDURE — 2580000003 HC RX 258: Performed by: INTERNAL MEDICINE

## 2023-09-26 PROCEDURE — 80048 BASIC METABOLIC PNL TOTAL CA: CPT

## 2023-09-26 PROCEDURE — 36415 COLL VENOUS BLD VENIPUNCTURE: CPT

## 2023-09-26 PROCEDURE — 1200000000 HC SEMI PRIVATE

## 2023-09-26 PROCEDURE — 85027 COMPLETE CBC AUTOMATED: CPT

## 2023-09-26 PROCEDURE — 6370000000 HC RX 637 (ALT 250 FOR IP): Performed by: INTERNAL MEDICINE

## 2023-09-26 PROCEDURE — 90935 HEMODIALYSIS ONE EVALUATION: CPT

## 2023-09-26 RX ORDER — HEPARIN SODIUM 1000 [USP'U]/ML
1600 INJECTION, SOLUTION INTRAVENOUS; SUBCUTANEOUS PRN
Status: DISCONTINUED | OUTPATIENT
Start: 2023-09-26 | End: 2023-09-29 | Stop reason: HOSPADM

## 2023-09-26 RX ADMIN — HEPARIN SODIUM 1600 UNITS: 1000 INJECTION INTRAVENOUS; SUBCUTANEOUS at 13:26

## 2023-09-26 RX ADMIN — LEVOTHYROXINE SODIUM 100 MCG: 100 TABLET ORAL at 08:39

## 2023-09-26 RX ADMIN — FLUCONAZOLE 200 MG: 200 TABLET ORAL at 08:39

## 2023-09-26 RX ADMIN — SODIUM CHLORIDE, PRESERVATIVE FREE 10 ML: 5 INJECTION INTRAVENOUS at 08:40

## 2023-09-26 RX ADMIN — APIXABAN 2.5 MG: 2.5 TABLET, FILM COATED ORAL at 20:31

## 2023-09-26 RX ADMIN — SODIUM CHLORIDE, PRESERVATIVE FREE 10 ML: 5 INJECTION INTRAVENOUS at 20:38

## 2023-09-26 RX ADMIN — Medication 1 CAPSULE: at 08:39

## 2023-09-26 RX ADMIN — ANTI-FUNGAL POWDER MICONAZOLE NITRATE TALC FREE: 1.42 POWDER TOPICAL at 08:40

## 2023-09-26 RX ADMIN — HEPARIN SODIUM 1600 UNITS: 1000 INJECTION INTRAVENOUS; SUBCUTANEOUS at 13:28

## 2023-09-26 NOTE — PROGRESS NOTES
Today's Date: 9/26/2023  Patient Name: Julian Hanson  Date of admission: 9/17/2023 12:17 PM  Patient's age: 80 y.o., 1934  Admission Dx: Acute kidney injury ([address]) [N17.9]  DARIUSZ (acute kidney injury) ([address]) [N17.9]      Requesting Physician: No admitting provider for patient encounter. CHIEF COMPLAINT: Altered mental status. DARIUSZ. Dehydration. Consult for bone lytic lesions of the skull. SUBJECTIVE:  Patient was seen and examined. Had dialysis  today   NO fever chills  NO NV  Patient's POA Kristy at bedside  Was goals of care with patient and her POA  BRIEF CASE HISTORY:    The patient is a 80 y.o.  female who is admitted to the hospital for further management of altered mental status and DARIUSZ and dehydration. The patient had 2 weeks history of increasing weakness and fatigue and decreased oral intake. She had altered mental status on admission but it has improved later on. She was found to have DARIUSZ and she had dialysis. Mentally she has improved significantly. Further work-up with imaging of the head CT scan showed lytic lesions in the frontal skull. No brain lesions were noted. Further lab work-up showed elevated kappa light chain immunoglobulin at 7168. We have been consulted for further management. Patient is awake and alert and she has no complaints at the present time. No bone aches. No fever. No active bleeding. Past Medical History:   has a past medical history of Hx of blood clots, Hyperlipidemia, Hypertension, Pulmonary embolism ([address]), and Thyroid disease. Past Surgical History:   has a past surgical history that includes IR NONTUNNELED VASCULAR CATHETER > 5 YEARS (9/18/2023) and ERCP (N/A, 9/20/2023). Family History: family history is not on file. Social History:   reports that she has never smoked. She has never used smokeless tobacco. She reports that she does not currently use alcohol.  She reports that deformity or swelling  Extremities - peripheral pulses normal, no pedal edema, no clubbing or cyanosis  Skin - normal coloration and turgor, no rashes, no suspicious skin lesions noted           DATA:      Labs:       CBC:   Recent Labs     09/25/23  0542 09/26/23  0858   WBC 6.1 7.3   HGB 11.0* 9.9*   HCT 31.6* 30.5*   * 174       BMP:   Recent Labs     09/25/23  0542 09/26/23  0858   * 133*   K 4.5 4.4   CO2 22 21   BUN 62* 59*   CREATININE 8.4* 8.4*   LABGLOM 4* 4*   GLUCOSE 93 133*       PT/INR:   No results for input(s): \"PROTIME\", \"INR\" in the last 72 hours. APTT:  No results for input(s): \"APTT\" in the last 72 hours. LIVER PROFILE:  No results for input(s): \"AST\", \"ALT\", \"LABALBU\" in the last 72 hours. Echo (TTE) complete (with contrast/ bubble/ strain/ 3D PRN)    Left Ventricle: Normal left ventricular systolic function with a   visually estimated EF of 55 - 60%. Left ventricle size is normal. Mildly   increased wall thickness. Normal wall motion. Right Ventricle: Not well visualized. Mitral Valve: Mildly thickened leaflet. Left Atrium: Left atrium is mildly dilated.     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] 09/21/2023    Dependence on renal dialysis ([address]) [Z99.2] 09/20/2023    Choledocholithiasis [K80.50] 09/19/2023    Multiple myeloma not having achieved remission ([address]) [C90.00] 09/19/2023    Hyperkalemia [E87.5] 09/18/2023    Confusion [R41.0] 09/18/2023    Goals of care, counseling/discussion [Z71.89] 09/18/2023    Encounter for palliative care [Z51.5] 09/18/2023    Acute kidney injury ([address]) [N17.9] 09/17/2023    DARIUSZ (acute kidney injury) ([address]) [N17.9] 09/17/2023    Edema [R60.9] 09/17/2023    Duodenal ulcer [K26.9] 02/07/2018    Pulmonary embolism ([address]) [I26.99] 12/01/2017    Hypothyroidism [E03.9] 05/30/2013    Hypertension, essential [I10] 05/04/2012    Hyperlipidemia [E78.5] 05/04/2012     Multiple myeloma. Kappa light chain or IgG kappa multiple myeloma. 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 Renal insufficiency, at least in part related to multiple myeloma. RECOMMENDATIONS:  I reviewed the lab records and images and discussed with the patient. Findings with CT scan showing lytic bone lesions in the skull in addition to the very high kappa light chain immunoglobulin above 7000 are consistent with multiple myeloma. 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 Hermelinda Perez MD, MD  [facility]                            This note is created with the assistance of a speech recognition program.  While intending to generate a document that actually reflects the content of the visit, the document can still have some errors including those of syntax and sound a like substitutions which may escape proof reading. It such instances, actual meaning can be extrapolated by contextual diversion.

## 2023-09-26 NOTE — PROGRESS NOTES
NEPHROLOGY DIALYSIS NOTE    PROCEDURE    Patient seen on Hemodialysis  9/26/2023 at 11:04 AM  Access cannulated without problems      TREATMENT ORDERS   See dialysis flowsheet for specifics on access, blood flow rate, dialysate baths, duration of dialysis, anticoagulation and other technical information. Dialysis Bath  K+ (Potassium): 3  Ca+ (Calcium): 2.25  Na+ (Sodium): 135  HCO3 (Bicarb): 35       INVESTIGATIONS     Last 3 CMP:    Recent Labs     09/24/23  0628 09/25/23  0542 09/26/23  0858   * 133* 133*   K 4.2 4.5 4.4   CL 95* 96* 96*   CO2 25 22 21   BUN 48* 62* 59*   CREATININE 7.1* 8.4* 8.4*   CALCIUM 8.7 8.6 8.6       Last 3 CBC:  Recent Labs     09/25/23  0542 09/26/23  0858   WBC 6.1 7.3   RBC 3.05* 2.90*   HGB 11.0* 9.9*   HCT 31.6* 30.5*   .6* 105.2*   MCH 36.1* 34.1*   MCHC 34.8 32.5   RDW 13.6 13.2   * 174   MPV 11.0 11.4         GFR: Estimated Creatinine Clearance: 4 mL/min (A) (based on SCr of 8.4 mg/dL [facility])). Phosphorus:  No results for input(s): \"PHOS\" in the last 72 hours. Magnesium: No results for input(s): \"MG\" in the last 72 hours. Albumin: No results for input(s): \"LABALBU\" in the last 72 hours.   PTH                :No results found for: \"PTH\"           MEDICATIONS     Scheduled Meds:    fluconazole  200 mg Oral Daily    nystatin  5 mL Oral 4x Daily    miconazole   Topical BID    lactobacillus  1 capsule Oral Daily with breakfast    sodium chloride flush  5-40 mL IntraVENous 2 times per day    insulin lispro  0-8 Units SubCUTAneous TID WC    insulin lispro  0-4 Units SubCUTAneous Nightly    levothyroxine  100 mcg Oral Daily    amLODIPine  10 mg Oral Daily     Continuous Infusions:    dextrose       PRN Meds:  heparin (porcine), heparin (porcine), sodium chloride flush, ondansetron **OR** ondansetron, [DISCONTINUED] acetaminophen **OR** acetaminophen, glucose, dextrose bolus **OR** dextrose bolus, glucagon (rDNA), dextrose, hydrALAZINE, heparin (porcine), heparin

## 2023-09-26 NOTE — CARE COORDINATION
Spoke to patient at bedside regarding SNF vs ARU. Patient states that she does not think she will be able to participate in the required 3 hours and would prefer to go to the [address]. Called the [facility], spoke to Alvarenga Ceferino and she tells me that she has no beds available at this time. 0900 Called Kristy ZELAYA to update her on [facility] declining. She is asking for a referral to [facility]. Referral sent   [facility] has no beds available this week    1444 received a call from Elaine at [facility] they have declined admission. Updated patient     [phone] Spoke to patient and Nahum Shah at bedside. I asked if she has chosen any other facilities. She tells me that [address] [place] [address] would be there next choice. I asked them to continue to review th list as they may not have any beds. They agreed.      [address] from [facility] called to inform me that [address] [place] [address] has no beds and Wilmar RUBIN is DONA

## 2023-09-26 NOTE — PLAN OF CARE
Problem: Discharge Planning  Goal: Discharge to home or other facility with appropriate resources  Outcome: Progressing  Flowsheets (Taken 9/26/2023 0800 by Edelmira Jackman RN)  Discharge to home or other facility with appropriate resources: Identify barriers to discharge with patient and caregiver     Problem: Safety - Adult  Goal: Free from fall injury  Outcome: Progressing     Problem: ABCDS Injury Assessment  Goal: Absence of physical injury  Outcome: Progressing     Problem: Skin/Tissue Integrity  Goal: Absence of new skin breakdown  Description: 1. Monitor for areas of redness and/or skin breakdown  2. Assess vascular access sites hourly  3. Every 4-6 hours minimum:  Change oxygen saturation probe site  4. Every 4-6 hours:  If on nasal continuous positive airway pressure, respiratory therapy assess nares and determine need for appliance change or resting period.   Outcome: Progressing     Problem: Neurosensory - Adult  Goal: Achieves stable or improved neurological status  Outcome: Progressing  Flowsheets (Taken 9/26/2023 0800 by Edelmira Jackman RN)  Achieves stable or improved neurological status: Assess for and report changes in neurological status     Problem: Cardiovascular - Adult  Goal: Maintains optimal cardiac output and hemodynamic stability  Outcome: Progressing  Flowsheets (Taken 9/26/2023 0800 by Edelmira Jackman RN)  Maintains optimal cardiac output and hemodynamic stability: Monitor blood pressure and heart rate

## 2023-09-26 NOTE — PROGRESS NOTES
Dialysis Time Out  To be done by RN and tech or 2 RNs  Staff Names Princess Gonzales. RN    [x]  Identity of the patient using 2 patient identifiers  [x]  Consent for treatment  [x]  Equipment-proper machine and dialyzer  [x]  B-Hep B status  [x]  Orders- to include bath, blood flow, dialyzer, time and fluid removal  [x]  Access-Correct site and in working order  [x]  Time for patient to ask questions.

## 2023-09-26 NOTE — PLAN OF CARE
Problem: Discharge Planning  Goal: Discharge to home or other facility with appropriate resources  Outcome: Progressing     Problem: Safety - Adult  Goal: Free from fall injury  Outcome: Progressing     Problem: ABCDS Injury Assessment  Goal: Absence of physical injury  Outcome: Progressing     Problem: Skin/Tissue Integrity  Goal: Absence of new skin breakdown  Description: 1. Monitor for areas of redness and/or skin breakdown  2. Assess vascular access sites hourly  3. Every 4-6 hours minimum:  Change oxygen saturation probe site  4. Every 4-6 hours:  If on nasal continuous positive airway pressure, respiratory therapy assess nares and determine need for appliance change or resting period.   Outcome: Progressing     Problem: Neurosensory - Adult  Goal: Achieves stable or improved neurological status  Outcome: Progressing  Goal: Achieves maximal functionality and self care  Outcome: Progressing     Problem: Respiratory - Adult  Goal: Achieves optimal ventilation and oxygenation  Outcome: Progressing     Problem: Cardiovascular - Adult  Goal: Maintains optimal cardiac output and hemodynamic stability  Outcome: Progressing  Goal: Absence of cardiac dysrhythmias or at baseline  Outcome: Progressing     Problem: Skin/Tissue Integrity - Adult  Goal: Skin integrity remains intact  Outcome: Progressing     Problem: Musculoskeletal - Adult  Goal: Return mobility to safest level of function  Outcome: Progressing  Goal: Return ADL status to a safe level of function  Outcome: Progressing     Problem: Genitourinary - Adult  Goal: Absence of urinary retention  Outcome: Progressing  Goal: Urinary catheter remains patent  Outcome: Progressing     Problem: Metabolic/Fluid and Electrolytes - Adult  Goal: Electrolytes maintained within normal limits  Outcome: Progressing     Problem: Pain  Goal: Verbalizes/displays adequate comfort level or baseline comfort level  Outcome: Progressing     Problem: Nutrition Deficit:  Goal: Optimize nutritional status  Outcome: Progressing

## 2023-09-27 LAB
ANION GAP SERPL CALCULATED.3IONS-SCNC: 13 MMOL/L (ref 9–17)
ANION GAP SERPL CALCULATED.3IONS-SCNC: 15 MMOL/L (ref 9–17)
BASOPHILS # BLD: <0.03 K/UL (ref 0–0.2)
BASOPHILS NFR BLD: 0 % (ref 0–2)
BUN SERPL-MCNC: 29 MG/DL (ref 8–23)
BUN SERPL-MCNC: 31 MG/DL (ref 8–23)
CALCIUM SERPL-MCNC: 8.3 MG/DL (ref 8.6–10.4)
CALCIUM SERPL-MCNC: 8.4 MG/DL (ref 8.6–10.4)
CHLORIDE SERPL-SCNC: 92 MMOL/L (ref 98–107)
CHLORIDE SERPL-SCNC: 93 MMOL/L (ref 98–107)
CO2 SERPL-SCNC: 26 MMOL/L (ref 20–31)
CO2 SERPL-SCNC: 26 MMOL/L (ref 20–31)
CREAT SERPL-MCNC: 6 MG/DL (ref 0.5–0.9)
CREAT SERPL-MCNC: 6.1 MG/DL (ref 0.5–0.9)
EOSINOPHIL # BLD: 0.09 K/UL (ref 0–0.44)
EOSINOPHILS RELATIVE PERCENT: 1 % (ref 1–4)
ERYTHROCYTE [DISTWIDTH] IN BLOOD BY AUTOMATED COUNT: 13.3 % (ref 11.8–14.4)
ERYTHROCYTE [DISTWIDTH] IN BLOOD BY AUTOMATED COUNT: 13.4 % (ref 11.8–14.4)
GFR SERPL CREATININE-BSD FRML MDRD: 6 ML/MIN/1.73M2
GFR SERPL CREATININE-BSD FRML MDRD: 6 ML/MIN/1.73M2
GLUCOSE BLD-MCNC: 112 MG/DL (ref 65–105)
GLUCOSE BLD-MCNC: 127 MG/DL (ref 65–105)
GLUCOSE BLD-MCNC: 140 MG/DL (ref 65–105)
GLUCOSE BLD-MCNC: 98 MG/DL (ref 65–105)
GLUCOSE SERPL-MCNC: 122 MG/DL (ref 70–99)
GLUCOSE SERPL-MCNC: 90 MG/DL (ref 70–99)
HCT VFR BLD AUTO: 28.8 % (ref 36.3–47.1)
HCT VFR BLD AUTO: 30.9 % (ref 36.3–47.1)
HGB BLD-MCNC: 10 G/DL (ref 11.9–15.1)
HGB BLD-MCNC: 9.4 G/DL (ref 11.9–15.1)
IMM GRANULOCYTES # BLD AUTO: 0.12 K/UL (ref 0–0.3)
IMM GRANULOCYTES NFR BLD: 2 %
LYMPHOCYTES NFR BLD: 1.21 K/UL (ref 1.1–3.7)
LYMPHOCYTES RELATIVE PERCENT: 15 % (ref 24–43)
MCH RBC QN AUTO: 33.1 PG (ref 25.2–33.5)
MCH RBC QN AUTO: 33.6 PG (ref 25.2–33.5)
MCHC RBC AUTO-ENTMCNC: 32.4 G/DL (ref 28.4–34.8)
MCHC RBC AUTO-ENTMCNC: 32.6 G/DL (ref 28.4–34.8)
MCV RBC AUTO: 102.3 FL (ref 82.6–102.9)
MCV RBC AUTO: 102.9 FL (ref 82.6–102.9)
MONOCYTES NFR BLD: 0.87 K/UL (ref 0.1–1.2)
MONOCYTES NFR BLD: 11 % (ref 3–12)
NEUTROPHILS NFR BLD: 71 % (ref 36–65)
NEUTS SEG NFR BLD: 5.77 K/UL (ref 1.5–8.1)
NRBC BLD-RTO: 0 PER 100 WBC
NRBC BLD-RTO: 0 PER 100 WBC
PLATELET # BLD AUTO: 168 K/UL (ref 138–453)
PLATELET # BLD AUTO: 176 K/UL (ref 138–453)
PMV BLD AUTO: 11.7 FL (ref 8.1–13.5)
PMV BLD AUTO: 11.8 FL (ref 8.1–13.5)
POTASSIUM SERPL-SCNC: 3.9 MMOL/L (ref 3.7–5.3)
POTASSIUM SERPL-SCNC: 4.2 MMOL/L (ref 3.7–5.3)
RBC # BLD AUTO: 2.8 M/UL (ref 3.95–5.11)
RBC # BLD AUTO: 3.02 M/UL (ref 3.95–5.11)
SODIUM SERPL-SCNC: 132 MMOL/L (ref 135–144)
SODIUM SERPL-SCNC: 133 MMOL/L (ref 135–144)
WBC OTHER # BLD: 8.1 K/UL (ref 3.5–11.3)
WBC OTHER # BLD: 8.4 K/UL (ref 3.5–11.3)

## 2023-09-27 PROCEDURE — 85027 COMPLETE CBC AUTOMATED: CPT

## 2023-09-27 PROCEDURE — 90935 HEMODIALYSIS ONE EVALUATION: CPT

## 2023-09-27 PROCEDURE — 6370000000 HC RX 637 (ALT 250 FOR IP): Performed by: STUDENT IN AN ORGANIZED HEALTH CARE EDUCATION/TRAINING PROGRAM

## 2023-09-27 PROCEDURE — 6370000000 HC RX 637 (ALT 250 FOR IP): Performed by: INTERNAL MEDICINE

## 2023-09-27 PROCEDURE — 36415 COLL VENOUS BLD VENIPUNCTURE: CPT

## 2023-09-27 PROCEDURE — 2580000003 HC RX 258: Performed by: INTERNAL MEDICINE

## 2023-09-27 PROCEDURE — 1200000000 HC SEMI PRIVATE

## 2023-09-27 PROCEDURE — 80048 BASIC METABOLIC PNL TOTAL CA: CPT

## 2023-09-27 PROCEDURE — 85025 COMPLETE CBC W/AUTO DIFF WBC: CPT

## 2023-09-27 PROCEDURE — 6360000002 HC RX W HCPCS: Performed by: INTERNAL MEDICINE

## 2023-09-27 RX ORDER — HYDRALAZINE HYDROCHLORIDE 20 MG/ML
10 INJECTION INTRAMUSCULAR; INTRAVENOUS EVERY 6 HOURS PRN
Status: DISCONTINUED | OUTPATIENT
Start: 2023-09-27 | End: 2023-09-29 | Stop reason: HOSPADM

## 2023-09-27 RX ADMIN — ANTI-FUNGAL POWDER MICONAZOLE NITRATE TALC FREE: 1.42 POWDER TOPICAL at 08:26

## 2023-09-27 RX ADMIN — LEVOTHYROXINE SODIUM 100 MCG: 100 TABLET ORAL at 08:26

## 2023-09-27 RX ADMIN — APIXABAN 2.5 MG: 2.5 TABLET, FILM COATED ORAL at 08:26

## 2023-09-27 RX ADMIN — HEPARIN SODIUM 1600 UNITS: 1000 INJECTION INTRAVENOUS; SUBCUTANEOUS at 13:15

## 2023-09-27 RX ADMIN — SODIUM CHLORIDE, PRESERVATIVE FREE 10 ML: 5 INJECTION INTRAVENOUS at 20:14

## 2023-09-27 RX ADMIN — Medication 1 CAPSULE: at 08:25

## 2023-09-27 RX ADMIN — Medication 500000 UNITS: at 14:31

## 2023-09-27 RX ADMIN — Medication 500000 UNITS: at 08:26

## 2023-09-27 RX ADMIN — Medication 500000 UNITS: at 17:20

## 2023-09-27 RX ADMIN — AMLODIPINE BESYLATE 10 MG: 10 TABLET ORAL at 08:25

## 2023-09-27 RX ADMIN — HEPARIN SODIUM 1600 UNITS: 1000 INJECTION INTRAVENOUS; SUBCUTANEOUS at 13:14

## 2023-09-27 RX ADMIN — ANTI-FUNGAL POWDER MICONAZOLE NITRATE TALC FREE: 1.42 POWDER TOPICAL at 21:10

## 2023-09-27 RX ADMIN — APIXABAN 2.5 MG: 2.5 TABLET, FILM COATED ORAL at 20:11

## 2023-09-27 RX ADMIN — FLUCONAZOLE 200 MG: 200 TABLET ORAL at 08:26

## 2023-09-27 RX ADMIN — SODIUM CHLORIDE, PRESERVATIVE FREE 10 ML: 5 INJECTION INTRAVENOUS at 08:26

## 2023-09-27 NOTE — CARE COORDINATION
HD REFERRAL STATUS    [facility]    Chair Time/Schedule: [facility] 5:15am    Start Date: 9/29/23    Financial Clearance: Approved    Medical Clearance: Approved    -Provided update to [facility]. Patient awaiting SNF palcement.

## 2023-09-27 NOTE — PROGRESS NOTES
Dialysis Time Out  To be done by RN and tech or 2 RNs  Staff Names Kadi POE RN and Chester Weems RN    [x]  Identity of the patient using 2 patient identifiers  [x]  Consent for treatment  [x]  Equipment-proper machine and dialyzer  [x]  B-Hep B status  [x]  Orders- to include bath, blood flow, dialyzer, time and fluid removal  [x]  Access-Correct site and in working order  [x]  Time for patient to ask questions.

## 2023-09-27 NOTE — CARE COORDINATION
[address], I was informed that they are out of network. Called [place] and informed her that all the referrals have denied admission. She asks that I send an e-mail with the list form the insurance company so she can choose a few mor facilities  [facility] list sent  Email: [email or web address]    [address] calls and tells me that she would like to try [address] Referral sent. I asked for several more choices as sending one at a time is delaying her getting the therapy that she needs. She tells me that she has someone helping her looking at  the facilities    [address] to see if the patient could be put on a wait list.  Patricio Villafana states she will put her on a wait list.  She asked if the patient is willing to go into a semi private then transition into a private when one becomes available.   I told her I would ask when the patient returns from dialysis

## 2023-09-27 NOTE — PLAN OF CARE
Problem: Discharge Planning  Goal: Discharge to home or other facility with appropriate resources  9/26/2023 [address], [address]  Outcome: Progressing  9/26/2023 1755 by Angela Johnson RN  Outcome: Progressing  Flowsheets (Taken 9/26/2023 0800 by Elina Wasserman RN)  Discharge to home or other facility with appropriate resources: Identify barriers to discharge with patient and caregiver     Problem: Safety - Adult  Goal: Free from fall injury  9/26/2023 2145 by Shayy Alvarez RN  Outcome: Progressing  9/26/2023 [address] by Angela Johnson RN  Outcome: Progressing     Problem: ABCDS Injury Assessment  Goal: Absence of physical injury  9/26/2023 2145 by Shayy Alvarez RN  Outcome: Progressing  9/26/2023 1755 by Angela Johnson RN  Outcome: Progressing     Problem: Skin/Tissue Integrity  Goal: Absence of new skin breakdown  Description: 1. Monitor for areas of redness and/or skin breakdown  2. Assess vascular access sites hourly  3. Every 4-6 hours minimum:  Change oxygen saturation probe site  4. Every 4-6 hours:  If on nasal continuous positive airway pressure, respiratory therapy assess nares and determine need for appliance change or resting period.   9/26/2023 2145 by Shayy Alvarez RN  Outcome: Progressing  9/26/2023 [address] by Angela Johnson RN  Outcome: Progressing     Problem: Neurosensory - Adult  Goal: Achieves stable or improved neurological status  9/26/2023 2145 by Shayy Alvarez RN  Outcome: Progressing  9/26/2023 1755 by Angela Johnson RN  Outcome: Progressing  Flowsheets (Taken 9/26/2023 0800 by Elina Wasserman RN)  Achieves stable or improved neurological status: Assess for and report changes in neurological status  Goal: Achieves maximal functionality and self care  Outcome: Progressing  Flowsheets (Taken 9/26/2023 0800 by Elina Wasserman RN)  Achieves maximal functionality and self care: Monitor swallowing and airway patency with patient fatigue and changes in neurological status     Problem: Respiratory - Adult  Goal: Achieves optimal ventilation and oxygenation  Outcome: Progressing     Problem: Cardiovascular - Adult  Goal: Maintains optimal cardiac output and hemodynamic stability  9/26/2023 2[address] by [address], RN  Outcome: Progressing  9/26/2023 1755 by Maryse Alberto RN  Outcome: Progressing  Flowsheets (Taken 9/26/2023 0800 by Dean Chandra RN)  Maintains optimal cardiac output and hemodynamic stability: Monitor blood pressure and heart rate  Goal: Absence of cardiac dysrhythmias or at baseline  Outcome: Progressing     Problem: Skin/Tissue Integrity - Adult  Goal: Skin integrity remains intact  Outcome: Progressing  Flowsheets (Taken 9/26/2023 0800 by Dean Chandra RN)  Skin Integrity Remains Intact: Monitor for areas of redness and/or skin breakdown     Problem: Musculoskeletal - Adult  Goal: Return mobility to safest level of function  Outcome: Progressing  Flowsheets (Taken 9/26/2023 0800 by Dean Chandra RN)  Return Mobility to Safest Level of Function: Assess patient stability and activity tolerance for standing, transferring and ambulating with or without assistive devices  Goal: Return ADL status to a safe level of function  Outcome: Progressing  Flowsheets (Taken 9/26/2023 0800 by Dean Chandra RN)  Return ADL Status to a Safe Level of Function: Administer medication as ordered     Problem: Genitourinary - Adult  Goal: Absence of urinary retention  Outcome: Progressing  Goal: Urinary catheter remains patent  Outcome: Progressing     Problem: Metabolic/Fluid and Electrolytes - Adult  Goal: Electrolytes maintained within normal limits  Outcome: Progressing  Flowsheets (Taken 9/26/2023 0800 by Dean Chandra RN)  Electrolytes maintained within normal limits: Monitor labs and assess patient for signs and symptoms of electrolyte imbalances     Problem: Pain  Goal: Verbalizes/displays adequate comfort level or baseline comfort level  Outcome: Progressing  Flowsheets (Taken 9/26/2023 0815 by Rudi Pinon, RN)  Verbalizes/displays adequate comfort level or baseline comfort level: Encourage patient to monitor pain and request assistance     Problem: Nutrition Deficit:  Goal: Optimize nutritional status  Outcome: Progressing

## 2023-09-27 NOTE — PROGRESS NOTES
Occupational [address]  Occupational Therapy Not Seen Note    DATE: 2023    NAME: Charmayne Ready  MRN: [id number]   : 1934      Patient not seen this date for Occupational Therapy due to: Off floor at HD.     Electronically signed by ANTWAN Sweeney on 2023 at 11:27 AM

## 2023-09-27 NOTE — PROGRESS NOTES
Physical Therapy Cancel Note      DATE: 2023    NAME: Catherine Gutierrez  MRN: [id number]   : 1934      Patient not seen this date for Physical Therapy due to:    Hemodialysis:      Electronically signed by Anne Kelley PT on 2023 at 1:03 PM

## 2023-09-27 NOTE — PLAN OF CARE
Problem: Discharge Planning  Goal: Discharge to home or other facility with appropriate resources  9/27/2023 1735 by Mayelin Nicholson RN  Outcome: Progressing  9/27/2023 1735 by Mayelin Nicholson RN  Outcome: Progressing     Problem: Safety - Adult  Goal: Free from fall injury  9/27/2023 1735 by Mayelin Nicholson RN  Outcome: Progressing  9/27/2023 1735 by Mayelin Nicholson RN  Outcome: Progressing     Problem: ABCDS Injury Assessment  Goal: Absence of physical injury  9/27/2023 1735 by Mayelin Nicholson RN  Outcome: Progressing  9/27/2023 1735 by Mayelin Nicholson RN  Outcome: Progressing     Problem: Skin/Tissue Integrity  Goal: Absence of new skin breakdown  Description: 1. Monitor for areas of redness and/or skin breakdown  2. Assess vascular access sites hourly  3. Every 4-6 hours minimum:  Change oxygen saturation probe site  4. Every 4-6 hours:  If on nasal continuous positive airway pressure, respiratory therapy assess nares and determine need for appliance change or resting period.   9/27/2023 1735 by Mayelin Nicholson RN  Outcome: Progressing  9/27/2023 1735 by Mayelin Nicholson RN  Outcome: Progressing     Problem: Neurosensory - Adult  Goal: Achieves stable or improved neurological status  9/27/2023 1735 by Mayelin Nicholson RN  Outcome: Progressing  9/27/2023 1735 by Mayelin Nicholson RN  Outcome: Progressing  Goal: Achieves maximal functionality and self care  9/27/2023 1735 by Mayelin Nicholson RN  Outcome: Progressing  9/27/2023 1735 by Mayelin Nicholson RN  Outcome: Progressing     Problem: Respiratory - Adult  Goal: Achieves optimal ventilation and oxygenation  9/27/2023 1735 by Mayelin Nicholson RN  Outcome: Progressing  9/27/2023 1735 by Mayelin Nicholson RN  Outcome: Progressing     Problem: Cardiovascular - Adult  Goal: Maintains optimal cardiac output and hemodynamic stability  9/27/2023 1735 by Mayelin Nicholson RN  Outcome: Progressing  9/27/2023 1735 by Mayelin Nicholson RN  Outcome: Progressing  Goal: Absence of cardiac dysrhythmias or at baseline  9/27/2023 1735 by Yolanda Suarez RN  Outcome: Progressing  9/27/2023 1735 by Yolanda Suarez RN  Outcome: Progressing     Problem: Skin/Tissue Integrity - Adult  Goal: Skin integrity remains intact  9/27/2023 1735 by Yolanda Suarez RN  Outcome: Progressing  9/27/2023 1735 by Yolanda Suarez RN  Outcome: Progressing     Problem: Musculoskeletal - Adult  Goal: Return mobility to safest level of function  9/27/2023 1735 by Yolanda Suarez RN  Outcome: Progressing  9/27/2023 1735 by Yolanda Suarez RN  Outcome: Progressing  Goal: Return ADL status to a safe level of function  9/27/2023 1735 by Yolanda Suarez RN  Outcome: Progressing  9/27/2023 1735 by Yolanda Suarez RN  Outcome: Progressing     Problem: Genitourinary - Adult  Goal: Absence of urinary retention  9/27/2023 1735 by Yolanda Suarez, RN  Outcome: Progressing  9/27/2023 1735 by Yolanda Suarez RN  Outcome: Progressing  Goal: Urinary catheter remains patent  9/27/2023 1735 by Yolanda Suarez, RN  Outcome: Progressing  9/27/2023 1735 by Yolanda Suarez RN  Outcome: Progressing     Problem: Metabolic/Fluid and Electrolytes - Adult  Goal: Electrolytes maintained within normal limits  9/27/2023 1735 by Yolanda Suarez RN  Outcome: Progressing  9/27/2023 1735 by Yolanda Suarez RN  Outcome: Progressing     Problem: Pain  Goal: Verbalizes/displays adequate comfort level or baseline comfort level  9/27/2023 1735 by Yolanda Suarez RN  Outcome: Progressing  9/27/2023 1735 by Yolanda Suarez RN  Outcome: Progressing     Problem: Nutrition Deficit:  Goal: Optimize nutritional status  9/27/2023 1735 by Yolanda Suarez RN  Outcome: Progressing  9/27/2023 1735 by Yolanda Suarez RN  Outcome: Progressing

## 2023-09-28 LAB
ANION GAP SERPL CALCULATED.3IONS-SCNC: 12 MMOL/L (ref 9–17)
BASOPHILS # BLD: 0.03 K/UL (ref 0–0.2)
BASOPHILS NFR BLD: 0 % (ref 0–2)
BUN SERPL-MCNC: 18 MG/DL (ref 8–23)
CALCIUM SERPL-MCNC: 8.6 MG/DL (ref 8.6–10.4)
CHLORIDE SERPL-SCNC: 94 MMOL/L (ref 98–107)
CO2 SERPL-SCNC: 26 MMOL/L (ref 20–31)
CREAT SERPL-MCNC: 4.4 MG/DL (ref 0.5–0.9)
EOSINOPHIL # BLD: 0.12 K/UL (ref 0–0.44)
EOSINOPHILS RELATIVE PERCENT: 1 % (ref 1–4)
ERYTHROCYTE [DISTWIDTH] IN BLOOD BY AUTOMATED COUNT: 13.5 % (ref 11.8–14.4)
GFR SERPL CREATININE-BSD FRML MDRD: 9 ML/MIN/1.73M2
GLUCOSE BLD-MCNC: 103 MG/DL (ref 65–105)
GLUCOSE BLD-MCNC: 104 MG/DL (ref 65–105)
GLUCOSE BLD-MCNC: 106 MG/DL (ref 65–105)
GLUCOSE BLD-MCNC: 97 MG/DL (ref 65–105)
GLUCOSE SERPL-MCNC: 96 MG/DL (ref 70–99)
HCT VFR BLD AUTO: 27.4 % (ref 36.3–47.1)
HGB BLD-MCNC: 10.2 G/DL (ref 11.9–15.1)
IMM GRANULOCYTES # BLD AUTO: 0.15 K/UL (ref 0–0.3)
IMM GRANULOCYTES NFR BLD: 2 %
LYMPHOCYTES NFR BLD: 1.15 K/UL (ref 1.1–3.7)
LYMPHOCYTES RELATIVE PERCENT: 14 % (ref 24–43)
MCH RBC QN AUTO: 38.5 PG (ref 25.2–33.5)
MCHC RBC AUTO-ENTMCNC: 37.2 G/DL (ref 28.4–34.8)
MCV RBC AUTO: 103.4 FL (ref 82.6–102.9)
MONOCYTES NFR BLD: 0.81 K/UL (ref 0.1–1.2)
MONOCYTES NFR BLD: 10 % (ref 3–12)
NEUTROPHILS NFR BLD: 73 % (ref 36–65)
NEUTS SEG NFR BLD: 6.07 K/UL (ref 1.5–8.1)
NRBC BLD-RTO: 0.2 PER 100 WBC
PLATELET # BLD AUTO: 198 K/UL (ref 138–453)
PMV BLD AUTO: 11 FL (ref 8.1–13.5)
POTASSIUM SERPL-SCNC: 4.2 MMOL/L (ref 3.7–5.3)
RBC # BLD AUTO: 2.65 M/UL (ref 3.95–5.11)
RBC # BLD: ABNORMAL 10*6/UL
SODIUM SERPL-SCNC: 132 MMOL/L (ref 135–144)
WBC OTHER # BLD: 8.3 K/UL (ref 3.5–11.3)

## 2023-09-28 PROCEDURE — 1200000000 HC SEMI PRIVATE

## 2023-09-28 PROCEDURE — 6370000000 HC RX 637 (ALT 250 FOR IP): Performed by: INTERNAL MEDICINE

## 2023-09-28 PROCEDURE — 97110 THERAPEUTIC EXERCISES: CPT

## 2023-09-28 PROCEDURE — 82947 ASSAY GLUCOSE BLOOD QUANT: CPT

## 2023-09-28 PROCEDURE — 80048 BASIC METABOLIC PNL TOTAL CA: CPT

## 2023-09-28 PROCEDURE — 6370000000 HC RX 637 (ALT 250 FOR IP): Performed by: STUDENT IN AN ORGANIZED HEALTH CARE EDUCATION/TRAINING PROGRAM

## 2023-09-28 PROCEDURE — 2580000003 HC RX 258: Performed by: INTERNAL MEDICINE

## 2023-09-28 PROCEDURE — 97116 GAIT TRAINING THERAPY: CPT

## 2023-09-28 PROCEDURE — 97530 THERAPEUTIC ACTIVITIES: CPT

## 2023-09-28 PROCEDURE — 97535 SELF CARE MNGMENT TRAINING: CPT

## 2023-09-28 PROCEDURE — 85025 COMPLETE CBC W/AUTO DIFF WBC: CPT

## 2023-09-28 PROCEDURE — 94761 N-INVAS EAR/PLS OXIMETRY MLT: CPT

## 2023-09-28 PROCEDURE — 6360000002 HC RX W HCPCS: Performed by: STUDENT IN AN ORGANIZED HEALTH CARE EDUCATION/TRAINING PROGRAM

## 2023-09-28 PROCEDURE — 36415 COLL VENOUS BLD VENIPUNCTURE: CPT

## 2023-09-28 RX ORDER — LABETALOL HYDROCHLORIDE 5 MG/ML
10 INJECTION, SOLUTION INTRAVENOUS EVERY 6 HOURS PRN
Status: DISCONTINUED | OUTPATIENT
Start: 2023-09-28 | End: 2023-09-29 | Stop reason: HOSPADM

## 2023-09-28 RX ADMIN — AMLODIPINE BESYLATE 10 MG: 10 TABLET ORAL at 09:24

## 2023-09-28 RX ADMIN — APIXABAN 2.5 MG: 2.5 TABLET, FILM COATED ORAL at 20:21

## 2023-09-28 RX ADMIN — LEVOTHYROXINE SODIUM 100 MCG: 100 TABLET ORAL at 09:24

## 2023-09-28 RX ADMIN — SODIUM CHLORIDE, PRESERVATIVE FREE 10 ML: 5 INJECTION INTRAVENOUS at 09:25

## 2023-09-28 RX ADMIN — Medication 500000 UNITS: at 17:38

## 2023-09-28 RX ADMIN — Medication 500000 UNITS: at 09:25

## 2023-09-28 RX ADMIN — FLUCONAZOLE 200 MG: 200 TABLET ORAL at 09:24

## 2023-09-28 RX ADMIN — SODIUM CHLORIDE, PRESERVATIVE FREE 10 ML: 5 INJECTION INTRAVENOUS at 20:24

## 2023-09-28 RX ADMIN — Medication 500000 UNITS: at 20:27

## 2023-09-28 RX ADMIN — Medication 500000 UNITS: at 13:47

## 2023-09-28 RX ADMIN — HYDRALAZINE HYDROCHLORIDE 10 MG: 20 INJECTION, SOLUTION INTRAMUSCULAR; INTRAVENOUS at 04:16

## 2023-09-28 RX ADMIN — APIXABAN 2.5 MG: 2.5 TABLET, FILM COATED ORAL at 09:24

## 2023-09-28 RX ADMIN — Medication 1 CAPSULE: at 09:25

## 2023-09-28 RX ADMIN — ANTI-FUNGAL POWDER MICONAZOLE NITRATE TALC FREE: 1.42 POWDER TOPICAL at 09:25

## 2023-09-28 NOTE — PROGRESS NOTES
Today's Date: 9/27/2023  Patient Name: Willy Ha  Date of admission: 9/17/2023 12:17 PM  Patient's age: 80 y.o., 1934  Admission Dx: Acute kidney injury ([address]) [N17.9]  DARIUSZ (acute kidney injury) ([address]) [N17.9]      Requesting Physician: No admitting provider for patient encounter. CHIEF COMPLAINT: Altered mental status. DARIUSZ. Dehydration. Consult for bone lytic lesions of the skull. SUBJECTIVE:  Patient was seen and examined. NO fever chills  NO NV    BRIEF CASE HISTORY:    The patient is a 80 y.o.  female who is admitted to the hospital for further management of altered mental status and DARIUSZ and dehydration. The patient had 2 weeks history of increasing weakness and fatigue and decreased oral intake. She had altered mental status on admission but it has improved later on. She was found to have DARIUSZ and she had dialysis. Mentally she has improved significantly. Further work-up with imaging of the head CT scan showed lytic lesions in the frontal skull. No brain lesions were noted. Further lab work-up showed elevated kappa light chain immunoglobulin at 7168. We have been consulted for further management. Patient is awake and alert and she has no complaints at the present time. No bone aches. No fever. No active bleeding. Past Medical History:   has a past medical history of Hx of blood clots, Hyperlipidemia, Hypertension, Pulmonary embolism ([address]), and Thyroid disease. Past Surgical History:   has a past surgical history that includes IR NONTUNNELED VASCULAR CATHETER > 5 YEARS (9/18/2023); ERCP (N/A, 9/20/2023); and IR TUNNELED CVC PLACE WO SQ PORT/PUMP > 5 YEARS (9/25/2023). Family History: family history is not on file. Social History:   reports that she has never smoked. She has never used smokeless tobacco. She reports that she does not currently use alcohol. She reports that she does not use drugs. per day Goran Sainz MD   10 mL at 09/27/23 2014    sodium chloride flush 0.9 % injection 5-40 mL  5-40 mL IntraVENous PRN Goran Sainz MD        ondansetron (ZOFRAN-ODT) disintegrating tablet 4 mg  4 mg Oral Q8H PRN Goran Sainz MD        Or    ondansetron (ZOFRAN) injection 4 mg  4 mg IntraVENous Q6H PRN Goran Sainz MD        acetaminophen (TYLENOL) suppository 650 mg  650 mg Rectal Q6H PRN Goran Sainz MD   650 mg at 09/20/23 2324    glucose chewable tablet 16 g  4 tablet Oral PRN Goran Sainz MD        dextrose bolus 10% 125 mL  125 mL IntraVENous PRN Goran Sainz MD        Or    dextrose bolus 10% 250 mL  250 mL IntraVENous PRN Goran Sainz MD        glucagon (rDNA) injection 1 mg  1 mg IntraMUSCular PRN Goran Sainz MD        dextrose 10 % infusion   IntraVENous Continuous PRN Goran Sainz MD        levothyroxine (SYNTHROID) tablet 100 mcg  100 mcg Oral Daily Goran Sainz MD   100 mcg at 09/27/23 0826    amLODIPine (NORVASC) tablet 10 mg  10 mg Oral Daily Goran Sainz MD   10 mg at 09/27/23 0825       Allergies:  Penicillins, Dilaudid [hydromorphone hcl], and Meloxicam    REVIEW OF SYSTEMS:      General: Positive for weakness and fatigue. Positive for decreased appetite. No fever or chills. Eyes: No blurred vision, eye pain or double vision. Ears: No hearing problems or drainage. No tinnitus. Throat: No sore throat, problems with swallowing or dysphagia. Respiratory: No cough, sputum or hemoptysis. No shortness of breath. No pleuritic chest pain. Cardiovascular: No chest pain, orthopnea or PND. No lower extremity edema. No palpitation. Gastrointestinal: No problems with swallowing. No abdominal pain or bloating. No nausea or vomiting. No diarrhea or constipation. No GI bleeding. Genitourinary: No dysuria, hematuria, frequency or urgency. Musculoskeletal: Limited activities due to age and comorbidities. Dermatologic: No skin rashes or pruritus.  No myeloma. 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 Lana Silva MD, MD  [facility]                            This note is created with the assistance of a speech recognition program.  While intending to generate a document that actually reflects the content of the visit, the document can still have some errors including those of syntax and sound a like substitutions which may escape proof reading. It such instances, actual meaning can be extrapolated by contextual diversion.

## 2023-09-28 NOTE — PROGRESS NOTES
Occupational Therapy  Facility/Department: [facility]  Occupational Therapy Daily Treatment Note    Name: Catherine Gutierrez  : 1934  MRN: [id number]  Date of Service: 2023    Discharge Recommendations:  Patient would benefit from continued therapy after discharge  OT Equipment Recommendations  Equipment Needed: Yes  Mobility Devices: ADL Assistive Devices  ADL Assistive Devices: Reacher;Long-handled Shoe Horn;Long-handled Sponge;Sock-Aid Hard       Patient Diagnosis(es): The encounter diagnosis was Edema, unspecified type. Past Medical History:  has a past medical history of Hx of blood clots, Hyperlipidemia, Hypertension, Pulmonary embolism ([address]), and Thyroid disease. Past Surgical History:  has a past surgical history that includes IR NONTUNNELED VASCULAR CATHETER > 5 YEARS (2023); ERCP (N/A, 2023); and IR TUNNELED CVC PLACE WO SQ PORT/PUMP > 5 YEARS (2023). Treatment Diagnosis: DARIUSZ      Assessment   Performance deficits / Impairments: Decreased functional mobility ; Decreased ADL status; Decreased strength;Decreased safe awareness;Decreased cognition;Decreased endurance;Decreased high-level IADLs  Assessment: Pt would benefit from continued acute care and post acute care OT to address above listed deficits. Treatment Diagnosis: DARIUSZ  Prognosis: Good  Decision Making: Medium Complexity  REQUIRES OT FOLLOW-UP: Yes  Activity Tolerance  Activity Tolerance: Patient Tolerated treatment well  Activity Tolerance Comments: Pt followed all commands with increased time and occasional cueing with good return.         Plan   Occupational Therapy Plan  Times Per Week: 3-5 x/wk  Current Treatment Recommendations: Strengthening, Balance training, Functional mobility training, Endurance training, Cognitive reorientation, Safety education & training, Patient/Caregiver education & training, Equipment evaluation, education, & procurement, Self-Care / ADL, Home management training Restrictions  Restrictions/Precautions  Restrictions/Precautions: Fall Risk, Up as Tolerated  Required Braces or Orthoses?: No    Subjective   General  Patient assessed for rehabilitation services?: Yes  Response to previous treatment: Patient with no complaints from previous session  Family / Caregiver Present: No  General Comment  Comments: RN ok'd pt for OT this date. Pt agreed to session and was pleasant/cooperative yet confused throughout. Pt denies pain. Upon arrival pt supine in bed with HOB elevated. Pt completed bed mobility, sat EOB and completed functional transfer. At end of session pt returned to supine in bed w/ HOB elevated d/t increased fatigue and did not get much rest the night before. At end of session pt had all needs met, call light within reach, RN notified of pt's session and thanked pt for participation. Objective   Safety Devices  Type of Devices: All fall risk precautions in place;Call light within reach;Gait belt;Nurse notified; Patient at risk for falls; Left in bed  Restraints  Restraints Initially in Place: No  Balance  Sitting: Without support (Pt sat EOB, SUP, and completed static/dynamic sitting, pt sat ~15 mins, no LOB, completed reaching outside of HARISH.)  Standing: With support (stood for ~2 min for transfers from/to EOB, w/CGA w/RW, static standing only completed. no LOB, minimal lateral leaning, VCs for correct postural control.)  Transfer Training  Transfer Training: Yes  Overall Level of Assistance: Assist X1;Additional time; Adaptive equipment;Contact-guard assistance (w/ RW, from/to EOB)  Interventions: Safety awareness training;Verbal cues (VCs to use bedrails to push off of)  Sit to Stand: Assist X1;Additional time; Adaptive equipment;Contact-guard assistance  Stand to Sit: Contact-guard assistance; Adaptive equipment; Additional time;Assist X1  Gait  Overall Level of Assistance:  (GILLES; pt declined d/t just completing with PTA, and requested to stay EOB to be solutions  Insights: Decreased awareness of deficits  Initiation: Requires cues for some  Sequencing: Requires cues for some  Orientation  Overall Orientation Status: Impaired  Orientation Level: Disoriented to situation;Oriented to person;Oriented to place; Disoriented to time                  Education Given To: Patient  Education Provided: Role of Therapy; ADL Adaptive Strategies;Transfer Training;IADL Safety; Fall Prevention Strategies  Education Provided Comments: OT POC, transfer/walker safety, importance of participation in therapy, importance of daily hygiene and sitting in chair when not fatigued; good return.   Education Method: Demonstration;Verbal  Barriers to Learning: None  Education Outcome: Verbalized understanding;Continued education needed                  AM-PAC Score        AM-PAC Inpatient Daily Activity Raw Score: 17 (09/28/23 1049)  AM-PAC Inpatient ADL T-Scale Score : 37.26 (09/28/23 1049)  ADL Inpatient CMS 0-100% Score: 50.11 (09/28/23 1049)  ADL Inpatient CMS G-Code Modifier : CK (09/28/23 1049)    Tinneti Score       Goals  Short Term Goals  Time Frame for Short Term Goals: By discharge, pt will:  Short Term Goal 1: Demo SUP for functional transfers and functional mobility with use of LRAD for engagement in ADLs/IADLs  Short Term Goal 2: Follow 50% of 2-step commands with appropriate initiation and sequencing for improved participation in daily occupations  Short Term Goal 3: Demo Mod I for UB ADLs following setup  Short Term Goal 4: Demo SBA for LB ADLs and toileting tasks with setup and AE PRN  Short Term Goal 5: Demo 8 min dynamic standing and reaching outside HARISH with unilateral UE release and SBA to increase standing balance/tolerance for ADL/IADL participation       Therapy Time   Individual Concurrent Group Co-treatment   Time In 1004         Time Out 1028         Minutes 24         Timed Code Treatment Minutes: 24 Minutes       JAVON Henry/JAM

## 2023-09-28 NOTE — PLAN OF CARE
Problem: Discharge Planning  Goal: Discharge to home or other facility with appropriate resources  9/28/2023 0136 by Ezra Kelsey RN  Outcome: Progressing  9/27/2023 1735 by Juana Landa RN  Outcome: Progressing     Problem: Safety - Adult  Goal: Free from fall injury  9/28/2023 0136 by Ezra Kelsey RN  Outcome: Progressing  9/27/2023 1735 by Juana Landa RN  Outcome: Progressing     Problem: ABCDS Injury Assessment  Goal: Absence of physical injury  9/28/2023 0136 by Ezra Kelsey RN  Outcome: Progressing  9/27/2023 1735 by Juana Landa RN  Outcome: Progressing     Problem: Skin/Tissue Integrity  Goal: Absence of new skin breakdown  Description: 1. Monitor for areas of redness and/or skin breakdown  2. Assess vascular access sites hourly  3. Every 4-6 hours minimum:  Change oxygen saturation probe site  4. Every 4-6 hours:  If on nasal continuous positive airway pressure, respiratory therapy assess nares and determine need for appliance change or resting period.   9/28/2023 0136 by Ezra Kelsey RN  Outcome: Progressing  9/27/2023 1735 by Juana Landa RN  Outcome: Progressing     Problem: Neurosensory - Adult  Goal: Achieves stable or improved neurological status  9/28/2023 0136 by Ezra Kelsey RN  Outcome: Progressing  9/27/2023 1735 by Juana Landa RN  Outcome: Progressing  Goal: Achieves maximal functionality and self care  9/28/2023 0136 by Ezra Kelsey RN  Outcome: Progressing  9/27/2023 1735 by Juana Landa RN  Outcome: Progressing     Problem: Respiratory - Adult  Goal: Achieves optimal ventilation and oxygenation  9/28/2023 0136 by Ezra Kelsey RN  Outcome: Progressing  9/27/2023 1735 by Juana Landa RN  Outcome: Progressing     Problem: Cardiovascular - Adult  Goal: Maintains optimal cardiac output and hemodynamic stability  9/28/2023 0136 by Ezra Kelsey RN  Outcome: Progressing  9/27/2023 1735 by Juana Landa RN  Outcome: Progressing  Goal: Absence of cardiac dysrhythmias or at baseline  9/28/2023 0136 by Jero Donaldson RN  Outcome: Progressing  9/27/2023 1735 by Ramon Kuhn RN  Outcome: Progressing     Problem: Skin/Tissue Integrity - Adult  Goal: Skin integrity remains intact  9/28/2023 0136 by Jero Donaldson RN  Outcome: Progressing  Flowsheets (Taken 9/27/2023 2212)  Skin Integrity Remains Intact:   Monitor for areas of redness and/or skin breakdown   Assess vascular access sites hourly   Every 4-6 hours: If on nasal continuous positive airway pressure, respiratory therapy assesses nares and determine need for appliance change or resting period   Every 4-6 hours minimum: Change oxygen saturation probe site  9/27/2023 1735 by Ramon Kuhn RN  Outcome: Progressing     Problem: Musculoskeletal - Adult  Goal: Return mobility to safest level of function  9/28/2023 0136 by Jero Donaldson RN  Outcome: Progressing  9/27/2023 1735 by Ramon Kuhn RN  Outcome: Progressing  Goal: Return ADL status to a safe level of function  9/28/2023 0136 by Jero Donaldson RN  Outcome: Progressing  9/27/2023 1735 by Ramon Kuhn RN  Outcome: Progressing     Problem: Genitourinary - Adult  Goal: Absence of urinary retention  9/28/2023 0136 by Jero Donaldson RN  Outcome: Progressing  9/27/2023 1735 by Ramon Kuhn RN  Outcome: Progressing  Goal: Urinary catheter remains patent  9/28/2023 0136 by Jero Donaldson RN  Outcome: Progressing  9/27/2023 1735 by Ramon Kuhn RN  Outcome: Progressing     Problem: Metabolic/Fluid and Electrolytes - Adult  Goal: Electrolytes maintained within normal limits  9/28/2023 0136 by Jero Donaldson RN  Outcome: Progressing  9/27/2023 1735 by Ramon Kuhn RN  Outcome: Progressing     Problem: Pain  Goal: Verbalizes/displays adequate comfort level or baseline comfort level  9/28/2023 0136 by Jero Donaldson RN  Outcome: Progressing  9/27/2023 1735 by Ramon Kuhn RN  Outcome: Progressing     Problem: Nutrition Deficit:  Goal: Optimize nutritional status  9/28/2023 0136 by Jero Donaldson

## 2023-09-28 NOTE — PROGRESS NOTES
Comprehensive Nutrition Assessment    Type and Reason for Visit:  Reassess    Nutrition Recommendations/Plan:   Recommend liberalizing diet while appetite/PO intake is poor. Consider checking phos level. Trial of Ensure Clear (BID) and Magic Cup (daily). Malnutrition Assessment:  Malnutrition Status: At risk for malnutrition (Comment) (09/28/23 1111)    Context:  Acute Illness     Findings of the 6 clinical characteristics of malnutrition:  Energy Intake:  75% or less of estimated energy requirements for 7 or more days  Weight Loss:  Greater than 5% over 1 month (likely due in part to fluid)     Body Fat Loss:  Unable to assess     Muscle Mass Loss:  Unable to assess    Fluid Accumulation:  No significant fluid accumulation  (-)   Strength:  Not Performed    Nutrition Assessment:    Pt continues to not eat well. States appetite remains decreased. Per RN documentation from yesterday, pt ate 1-25% x 2 meals, 50-75% x 1 meal. Only ate bites of muffin for breakfast this morning. Dislikes regular Ensure ONS, but agrees to try Ensure Clear, Magic Cup ONS. Labs reviewed: K+ WNL, no phos obtained during current admit. Pt reports possible d/c to facility today. Nutrition Related Findings:    meds/labs reviewed Wound Type: None       Current Nutrition Intake & Therapies:    Average Meal Intake: 1-25%, 51-75%  Average Supplements Intake: NPO  ADULT DIET; Regular; No Added Salt (3-4 gm); Low Potassium (Less than 3000 mg/day); Low Phosphorus (Less than 1000 mg)  ADULT ORAL NUTRITION SUPPLEMENT; Lunch, Dinner; Clear Liquid Oral Supplement  ADULT ORAL NUTRITION SUPPLEMENT; Dinner; Frozen Oral Supplement    Anthropometric Measures:  Height: 5' 5\" (165.1 cm)  Ideal Body Weight (IBW): 125 lbs (57 kg)    Admission Body Weight: 166 lb 10.7 oz (75.6 kg)  Current Body Weight: 146 lb 13.2 oz (66.6 kg), 117.5 % IBW.  Weight Source: Bed Scale  Current BMI (kg/m2): 24.4  Usual Body Weight: 160 lb (72.6 kg)  % Weight Change

## 2023-09-28 NOTE — PROGRESS NOTES
Physical Therapy  Fac[facility]Department: [facility]  Physical Therapy Daily Treatment Note    Name: Artis Estrada  : 1934  MRN: [id number]  Date of Service: 2023    Discharge Recommendations:  Patient would benefit from continued therapy after discharge   PT Equipment Recommendations  Equipment Needed: No      Patient Diagnosis(es): The encounter diagnosis was Edema, unspecified type. Past Medical History:  has a past medical history of Hx of blood clots, Hyperlipidemia, Hypertension, Pulmonary embolism ([address]), and Thyroid disease. Past Surgical History:  has a past surgical history that includes IR NONTUNNELED VASCULAR CATHETER > 5 YEARS (2023); ERCP (N/A, 2023); and IR TUNNELED CVC PLACE WO SQ PORT/PUMP > 5 YEARS (2023). Assessment   Body Structures, Functions, Activity Limitations Requiring Skilled Therapeutic Intervention: Decreased functional mobility ; Decreased strength;Decreased balance;Decreased cognition;Decreased endurance;Decreased posture  Assessment: Pt required Rell for supine to sit for trunk progression. Pt sat EOB for 10 mins with no LOB noted. Pt ambulated 15 ft. with CGA/Rell using a RW. Pt unwilling to attempt longer distance ambulation due to c/o increased fatigue. Pt is limited by decreased BLE strength, decreased endurance, and increased faituge with activity. Pt would benefit from continued PT following discharge to address functional deficits and regain baseline independence. Therapy Prognosis: Good  Decision Making: Medium Complexity  Requires PT Follow-Up: Yes  Activity Tolerance  Activity Tolerance: Patient tolerated treatment well;Patient limited by endurance  Activity Tolerance Comments: pt fearful of falling, resistive to longer distance ambulation.      Plan   Physcial Therapy Plan  General Plan:  (5-6x/week)  Current Treatment Recommendations: Strengthening, ROM, Balance training, Endurance training, Safety education & training,

## 2023-09-28 NOTE — H&P
Started Prior auth with Jose G. It is currently pending  Will check back frequently for auth status    [address], faxed to [facility] as requested. Once they verify Damaris Yañez will call. Will set up transportation once the [facility] approves. [facility] and was informed they did not receive my fax. Resent auth.

## 2023-09-28 NOTE — CARE COORDINATION
Anny Ricketts and updated her that I need therapy notes to start precert. Called PT and Lamona Goltz tells me that  she will notify the therapist that has the patient to please see her.

## 2023-09-29 VITALS
RESPIRATION RATE: 14 BRPM | TEMPERATURE: 98.4 F | BODY MASS INDEX: 23.88 KG/M2 | HEART RATE: 77 BPM | WEIGHT: 143.3 LBS | SYSTOLIC BLOOD PRESSURE: 124 MMHG | DIASTOLIC BLOOD PRESSURE: 58 MMHG | OXYGEN SATURATION: 95 % | HEIGHT: 65 IN

## 2023-09-29 DIAGNOSIS — Z99.2 DEPENDENCE ON RENAL DIALYSIS (HCC): Primary | ICD-10-CM

## 2023-09-29 DIAGNOSIS — C90.00 MULTIPLE MYELOMA, REMISSION STATUS UNSPECIFIED (HCC): ICD-10-CM

## 2023-09-29 LAB
ANION GAP SERPL CALCULATED.3IONS-SCNC: 12 MMOL/L (ref 9–17)
BASOPHILS # BLD: <0.03 K/UL (ref 0–0.2)
BASOPHILS NFR BLD: 0 % (ref 0–2)
BUN SERPL-MCNC: 28 MG/DL (ref 8–23)
CALCIUM SERPL-MCNC: 8.9 MG/DL (ref 8.6–10.4)
CHLORIDE SERPL-SCNC: 92 MMOL/L (ref 98–107)
CO2 SERPL-SCNC: 26 MMOL/L (ref 20–31)
CREAT SERPL-MCNC: 6.5 MG/DL (ref 0.5–0.9)
EOSINOPHIL # BLD: 0.12 K/UL (ref 0–0.44)
EOSINOPHILS RELATIVE PERCENT: 2 % (ref 1–4)
ERYTHROCYTE [DISTWIDTH] IN BLOOD BY AUTOMATED COUNT: 13.2 % (ref 11.8–14.4)
GFR SERPL CREATININE-BSD FRML MDRD: 6 ML/MIN/1.73M2
GLUCOSE BLD-MCNC: 94 MG/DL (ref 65–105)
GLUCOSE SERPL-MCNC: 90 MG/DL (ref 70–99)
HCT VFR BLD AUTO: 30.4 % (ref 36.3–47.1)
HGB BLD-MCNC: 10.2 G/DL (ref 11.9–15.1)
IMM GRANULOCYTES # BLD AUTO: 0.13 K/UL (ref 0–0.3)
IMM GRANULOCYTES NFR BLD: 2 %
LYMPHOCYTES NFR BLD: 1.19 K/UL (ref 1.1–3.7)
LYMPHOCYTES RELATIVE PERCENT: 15 % (ref 24–43)
MCH RBC QN AUTO: 34.8 PG (ref 25.2–33.5)
MCHC RBC AUTO-ENTMCNC: 33.6 G/DL (ref 28.4–34.8)
MCV RBC AUTO: 103.8 FL (ref 82.6–102.9)
MONOCYTES NFR BLD: 0.67 K/UL (ref 0.1–1.2)
MONOCYTES NFR BLD: 9 % (ref 3–12)
NEUTROPHILS NFR BLD: 72 % (ref 36–65)
NEUTS SEG NFR BLD: 5.67 K/UL (ref 1.5–8.1)
NRBC BLD-RTO: 0 PER 100 WBC
PLATELET # BLD AUTO: 205 K/UL (ref 138–453)
PMV BLD AUTO: 10.9 FL (ref 8.1–13.5)
POTASSIUM SERPL-SCNC: 4.2 MMOL/L (ref 3.7–5.3)
RBC # BLD AUTO: 2.93 M/UL (ref 3.95–5.11)
RBC # BLD: ABNORMAL 10*6/UL
SODIUM SERPL-SCNC: 130 MMOL/L (ref 135–144)
WBC OTHER # BLD: 7.8 K/UL (ref 3.5–11.3)

## 2023-09-29 PROCEDURE — 2580000003 HC RX 258: Performed by: INTERNAL MEDICINE

## 2023-09-29 PROCEDURE — 6370000000 HC RX 637 (ALT 250 FOR IP): Performed by: INTERNAL MEDICINE

## 2023-09-29 PROCEDURE — 82947 ASSAY GLUCOSE BLOOD QUANT: CPT

## 2023-09-29 PROCEDURE — 6370000000 HC RX 637 (ALT 250 FOR IP): Performed by: STUDENT IN AN ORGANIZED HEALTH CARE EDUCATION/TRAINING PROGRAM

## 2023-09-29 PROCEDURE — 6360000002 HC RX W HCPCS: Performed by: INTERNAL MEDICINE

## 2023-09-29 PROCEDURE — 80048 BASIC METABOLIC PNL TOTAL CA: CPT

## 2023-09-29 PROCEDURE — 85025 COMPLETE CBC W/AUTO DIFF WBC: CPT

## 2023-09-29 PROCEDURE — 90935 HEMODIALYSIS ONE EVALUATION: CPT

## 2023-09-29 PROCEDURE — 36415 COLL VENOUS BLD VENIPUNCTURE: CPT

## 2023-09-29 RX ORDER — HEPARIN SODIUM 1000 [USP'U]/ML
1000 INJECTION, SOLUTION INTRAVENOUS; SUBCUTANEOUS ONCE
Status: DISCONTINUED | OUTPATIENT
Start: 2023-09-29 | End: 2023-09-29 | Stop reason: HOSPADM

## 2023-09-29 RX ADMIN — LEVOTHYROXINE SODIUM 100 MCG: 100 TABLET ORAL at 08:18

## 2023-09-29 RX ADMIN — SODIUM CHLORIDE, PRESERVATIVE FREE 10 ML: 5 INJECTION INTRAVENOUS at 08:18

## 2023-09-29 RX ADMIN — Medication 500000 UNITS: at 13:28

## 2023-09-29 RX ADMIN — AMLODIPINE BESYLATE 10 MG: 10 TABLET ORAL at 08:18

## 2023-09-29 RX ADMIN — HEPARIN SODIUM 1600 UNITS: 1000 INJECTION INTRAVENOUS; SUBCUTANEOUS at 12:37

## 2023-09-29 RX ADMIN — Medication 500000 UNITS: at 08:18

## 2023-09-29 RX ADMIN — Medication 1 CAPSULE: at 08:18

## 2023-09-29 RX ADMIN — APIXABAN 2.5 MG: 2.5 TABLET, FILM COATED ORAL at 08:18

## 2023-09-29 RX ADMIN — FLUCONAZOLE 200 MG: 200 TABLET ORAL at 08:18

## 2023-09-29 NOTE — DISCHARGE SUMMARY
[facility]  Office: [phone]  Marie Luo, DO, Reynaldo Pichardo Blood, DO, Kamila Smith MD, Luz Holman MD, Tigist Gunter MD, Margaret Tristan MD,  Hafsa Daniel MD, Ainsley Aldrich MD, Anabella Paniagua DO, Demetrio Greer MD,  Hyun Rollins MD, Mallory Hurt MD, Chandu Vale DO, Mireya Fontanez MD,  Dominique Torres DO, Jose J Bhatti MD, Aranza Ledezma MD, Libby Galye MD, Jordan Ryan MD,  Babar Suarez MD, Anitha Morrow MD, Tushar Motta MD, Bailey Zepeda MD, Osmel Burgos DO, Don Castle MD,  Burak Hernandez MD, Charmayne Ready, MD, Jayden Badillo, CNP,  Kami Staley, CNP,, Tiffanie Santana, CNP,  Alyce Galvez, [facility], Emily Rodriguez, CNP, Mike Watkins, CNP, Mustapha Duff, CNP, Claudell Bowler, CNP, Flavio Gallagher, CNP, Adam Gongora, CNP, Homer Vargas, CNS, Niles Delgadillo, CNP, Dodie Frausto, [address]    Discharge Summary     Patient ID: Charmayne Ready  :  1934   MRN: [id number]     ACCOUNT:  [de-identified]   Patient's PCP: Lui Corley MD  Admit Date: 2023   Discharge Date: 2023     Length of Stay: 12  Code Status:  Full Code  Admitting Physician: No admitting provider for patient encounter. Discharge Physician: Jordan Ryan MD     Active Discharge Diagnoses:     Hospital Problem Lists:  Principal Problem:    Acute kidney injury [facility])  Active Problems:    DARIUSZ (acute kidney injury) ([address])    Hypertension, essential    Duodenal ulcer    Hyperlipidemia    Hypothyroidism    Pulmonary embolism (HCC)    Edema    Hyperkalemia    Confusion    Goals of care, counseling/discussion    Encounter for palliative care    Choledocholithiasis    Multiple myeloma ([address])    Dependence on renal dialysis ([address])    Paraproteinemia  Resolved Problems:    * No resolved hospital problems.  *      Admission Condition:  fair     Discharged  09/29/2023 05:56 AM    K 4.2 09/29/2023 05:56 AM    CL 92 09/29/2023 05:56 AM    CO2 26 09/29/2023 05:56 AM    ANIONGAP 12 09/29/2023 05:56 AM    BUN 28 09/29/2023 05:56 AM    CREATININE 6.5 09/29/2023 05:56 AM    CALCIUM 8.9 09/29/2023 05:56 AM    LABGLOM 6 09/29/2023 05:56 AM        Radiology:  IR TUNNELED CVC PLACE WO SQ PORT/PUMP > 5 YEARS    Result Date: 9/26/2023  Successful fluoroscopy guided tunneled dialysis catheter placement . Okay to use. Consultations:    Consults:     Final Specialist Recommendations/Findings:   IP CONSULT TO NEPHROLOGY  IP CONSULT TO SOCIAL WORK  IP CONSULT TO PALLIATIVE CARE  IP CONSULT TO HEM/ONC  IP CONSULT TO GI  IP CONSULT TO SPIRITUAL SERVICES      The patient was seen and examined on day of discharge and this discharge summary is in conjunction with any daily progress note from day of discharge.     Discharge plan:     Disposition: SNF    Physician Follow Up:     Gia Vargas MD  [address] [place] [address]  [phone]    Follow up      Gia Vargas MD  [address], [address] [place] [id number]  [phone]    Follow up in 4 week(s)         Requiring Further Evaluation/Follow Up POST HOSPITALIZATION/Incidental Findings: o/p hemonc f/u    Diet: renal diet    Activity: As tolerated    Instructions to Patient: as above    Discharge Medications:      Medication List        START taking these medications      fluconazole 200 MG tablet  Commonly known as: DIFLUCAN  Take 1 tablet by mouth daily for 6 doses     nystatin [id number] UNIT/ML suspension  Commonly known as: MYCOSTATIN  Take 5 mLs by mouth 4 times daily            CHANGE how you take these medications      amLODIPine 10 MG tablet  Commonly known as: NORVASC  Take 1 tablet by mouth daily  What changed:   medication strength  how much to take            CONTINUE taking these medications      apixaban 2.5 MG Tabs tablet  Commonly known as: ELIQUIS     ezetimibe 10 MG tablet  Commonly known as: ZETIA     pantoprazole 40 MG tablet  Commonly known as: PROTONIX     Synthroid 100 MCG tablet  Generic drug: levothyroxine            STOP taking these medications      aspirin 81 MG EC tablet     atenolol 100 MG tablet  Commonly known as: TENORMIN               Where to Get Your Medications        These medications were sent to [facility], [address] [facility],[address], [place] [address]      Phone: [phone]   fluconazole 200 MG tablet  nystatin [id number] UNIT/ML suspension       Information about where to get these medications is not yet available    Ask your nurse or doctor about these medications  amLODIPine 10 MG tablet         No discharge procedures on file. Time Spent on discharge is  36 mins in patient examination, evaluation, counseling as well as medication reconciliation, prescriptions for required medications, discharge plan and follow up. Electronically signed by   Eulalia Kenyon MD  9/29/2023  1:11 PM      Thank you Dr. Nato Contreras MD for the opportunity to be involved in this patient's care.

## 2023-09-29 NOTE — CARE COORDINATION
Called [facility] and left message that pt being discharged today to [facility]. Unsure if case has been assigned yet.

## 2023-09-29 NOTE — PROGRESS NOTES
Physical Therapy        Physical Therapy Cancel Note      DATE: 2023    NAME: Noa Milan  MRN: [id number]   : 1934      Patient not seen this date for Physical Therapy due to:    Hemodialysis:Pt currently off floor for HD, will check back this afternoon.        Electronically signed by Elvi Gann PTA on 2023 at 9:44 AM

## 2023-09-29 NOTE — CARE COORDINATION
HD REFERRAL STATUS    Clinic: [facility]    Chair Time/Schedule: [facility] 5:15am    Start Date: 10/2/23    Financial Clearance: Approved    Medical Clearance: Approved    -Updated Haritha that patient plans to be discharged to [facility] today. VALERIA updated. Patient notified of information and that clinic will continue to work towards finding a later chair time.

## 2023-09-29 NOTE — PROGRESS NOTES
Dialysis Time Out  To be done by RN and tech or 2 RNs  Staff Names Go Mahoney RN and Erin Orozco RN    [x]  Identity of the patient using 2 patient identifiers  [x]  Consent for treatment  [x]  Equipment-proper machine and dialyzer  [x]  B-Hep B status  [x]  Orders- to include bath, blood flow, dialyzer, time and fluid removal  [x]  Access-Correct site and in working order  [x]  Time for patient to ask questions.

## 2023-09-29 NOTE — PROGRESS NOTES
Writer called report to [address] [facility] ([phone]). Spoke with [place], all questions answered.

## 2023-09-29 NOTE — PLAN OF CARE
Problem: Discharge Planning  Goal: Discharge to home or other facility with appropriate resources  9/29/2023 0953 by Kaci Johnston RN  Outcome: Adequate for Discharge  Flowsheets (Taken 9/29/2023 7362)  Discharge to home or other facility with appropriate resources:   Identify barriers to discharge with patient and caregiver   Identify discharge learning needs (meds, wound care, etc)   Arrange for needed discharge resources and transportation as appropriate  9/29/2023 0434 by Scarlet Payne RN  Outcome: Progressing     Problem: Safety - Adult  Goal: Free from fall injury  9/29/2023 0953 by Kaci Johnston RN  Outcome: Adequate for Discharge  Flowsheets (Taken 9/29/2023 0953)  Free From Fall Injury: Instruct family/caregiver on patient safety  9/29/2023 0434 by Scarlet Payne RN  Outcome: Progressing     Problem: ABCDS Injury Assessment  Goal: Absence of physical injury  9/29/2023 0953 by Kaci Johnston RN  Outcome: Adequate for Discharge  Flowsheets (Taken 9/28/2023 1046)  Absence of Physical Injury: Implement safety measures based on patient assessment  9/29/2023 0434 by Scarlet aPyne RN  Outcome: Progressing     Problem: Skin/Tissue Integrity  Goal: Absence of new skin breakdown  Description: 1. Monitor for areas of redness and/or skin breakdown  2. Assess vascular access sites hourly  3. Every 4-6 hours minimum:  Change oxygen saturation probe site  4. Every 4-6 hours:  If on nasal continuous positive airway pressure, respiratory therapy assess nares and determine need for appliance change or resting period.   9/29/2023 0953 by Kaci Johnston RN  Outcome: Adequate for Discharge  9/29/2023 0434 by Scarlet Payne RN  Outcome: Progressing     Problem: Neurosensory - Adult  Goal: Achieves stable or improved neurological status  9/29/2023 0953 by Kaci Johnston RN  Outcome: Adequate for Discharge  Flowsheets (Taken 9/29/2023 4532)  Achieves stable or improved neurological status: Assess for and report changes in neurological Adult  Goal: Electrolytes maintained within normal limits  9/29/2023 0953 by Janene Dumont RN  Outcome: Adequate for Discharge  Flowsheets (Taken 9/29/2023 9311)  Electrolytes maintained within normal limits:   Monitor labs and assess patient for signs and symptoms of electrolyte imbalances   Administer electrolyte replacement as ordered   Monitor response to electrolyte replacements, including repeat lab results as appropriate  9/29/2023 0434 by Lissa Ireland RN  Outcome: Progressing     Problem: Pain  Goal: Verbalizes/displays adequate comfort level or baseline comfort level  9/29/2023 0953 by Janene Dumont RN  Outcome: Adequate for Discharge  Flowsheets (Taken 9/29/2023 4473)  Verbalizes/displays adequate comfort level or baseline comfort level:   Encourage patient to monitor pain and request assistance   Assess pain using appropriate pain scale   Administer analgesics based on type and severity of pain and evaluate response   Implement non-pharmacological measures as appropriate and evaluate response  9/29/2023 0434 by Lissa Ireland RN  Outcome: Progressing     Problem: Nutrition Deficit:  Goal: Optimize nutritional status  9/29/2023 0953 by Janene Dumont RN  Outcome: Adequate for Discharge  Flowsheets (Taken 9/29/2023 1655)  Nutrient intake appropriate for improving, restoring, or maintaining nutritional needs:   Assess nutritional status and recommend course of action   Monitor oral intake, labs, and treatment plans  9/29/2023 0434 by Lissa Ireland RN  Outcome: Progressing

## 2023-09-29 NOTE — PLAN OF CARE
Problem: Discharge Planning  Goal: Discharge to home or other facility with appropriate resources  Outcome: Progressing     Problem: Safety - Adult  Goal: Free from fall injury  Outcome: Progressing     Problem: ABCDS Injury Assessment  Goal: Absence of physical injury  Outcome: Progressing     Problem: Skin/Tissue Integrity  Goal: Absence of new skin breakdown  Description: 1. Monitor for areas of redness and/or skin breakdown  2. Assess vascular access sites hourly  3. Every 4-6 hours minimum:  Change oxygen saturation probe site  4. Every 4-6 hours:  If on nasal continuous positive airway pressure, respiratory therapy assess nares and determine need for appliance change or resting period.   Outcome: Progressing     Problem: Neurosensory - Adult  Goal: Achieves stable or improved neurological status  Outcome: Progressing  Goal: Achieves maximal functionality and self care  Outcome: Progressing     Problem: Respiratory - Adult  Goal: Achieves optimal ventilation and oxygenation  Outcome: Progressing     Problem: Cardiovascular - Adult  Goal: Maintains optimal cardiac output and hemodynamic stability  Outcome: Progressing  Goal: Absence of cardiac dysrhythmias or at baseline  Outcome: Progressing     Problem: Skin/Tissue Integrity - Adult  Goal: Skin integrity remains intact  Outcome: Progressing  Flowsheets (Taken 9/28/2023 2121)  Skin Integrity Remains Intact:   Monitor for areas of redness and/or skin breakdown   Assess vascular access sites hourly   Every 4-6 hours minimum: Change oxygen saturation probe site   Every 4-6 hours: If on nasal continuous positive airway pressure, respiratory therapy assesses nares and determine need for appliance change or resting period     Problem: Musculoskeletal - Adult  Goal: Return mobility to safest level of function  Outcome: Progressing  Goal: Return ADL status to a safe level of function  Outcome: Progressing     Problem: Genitourinary - Adult  Goal: Absence of urinary retention  Outcome: Progressing  Goal: Urinary catheter remains patent  Outcome: Progressing     Problem: Metabolic/Fluid and Electrolytes - Adult  Goal: Electrolytes maintained within normal limits  Outcome: Progressing     Problem: Pain  Goal: Verbalizes/displays adequate comfort level or baseline comfort level  Outcome: Progressing     Problem: Nutrition Deficit:  Goal: Optimize nutritional status  Outcome: Progressing

## 2023-10-03 ENCOUNTER — TELEPHONE (OUTPATIENT)
Dept: PALLATIVE CARE | Age: 88
End: 2023-10-03

## 2024-01-15 ENCOUNTER — APPOINTMENT (OUTPATIENT)
Dept: CT IMAGING | Age: 89
End: 2024-01-15
Payer: MEDICARE

## 2024-01-15 ENCOUNTER — HOSPITAL ENCOUNTER (EMERGENCY)
Age: 89
Discharge: HOME | End: 2024-01-15
Attending: EMERGENCY MEDICINE
Payer: MEDICARE

## 2024-01-15 VITALS
BODY MASS INDEX: 23.32 KG/M2 | SYSTOLIC BLOOD PRESSURE: 147 MMHG | HEIGHT: 65 IN | OXYGEN SATURATION: 95 % | HEART RATE: 74 BPM | DIASTOLIC BLOOD PRESSURE: 65 MMHG | RESPIRATION RATE: 14 BRPM | TEMPERATURE: 97.8 F | WEIGHT: 140 LBS

## 2024-01-15 DIAGNOSIS — S00.03XA HEMATOMA OF SCALP, INITIAL ENCOUNTER: ICD-10-CM
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
## 2024-01-15 DIAGNOSIS — M79.18 PAIN OF PARASPINAL MUSCLE: ICD-10-CM

## 2024-01-15 LAB
ANION GAP SERPL CALCULATED.3IONS-SCNC: 18 MMOL/L (ref 9–17)
BASOPHILS # BLD: 0 K/UL (ref 0–0.2)
BASOPHILS NFR BLD: 0 % (ref 0–2)
BUN SERPL-MCNC: 38 MG/DL (ref 8–23)
CALCIUM SERPL-MCNC: 9.7 MG/DL (ref 8.6–10.4)
CHLORIDE SERPL-SCNC: 89 MMOL/L (ref 98–107)
CO2 SERPL-SCNC: 25 MMOL/L (ref 20–31)
CREAT SERPL-MCNC: 8.7 MG/DL (ref 0.5–0.9)
EKG ATRIAL RATE: 70 BPM
EKG P AXIS: 50 DEGREES
EKG P-R INTERVAL: 148 MS
EKG Q-T INTERVAL: 400 MS
EKG QRS DURATION: 70 MS
EKG QTC CALCULATION (BAZETT): 432 MS
EKG R AXIS: 6 DEGREES
EKG T AXIS: 43 DEGREES
EKG VENTRICULAR RATE: 70 BPM
EOSINOPHIL # BLD: 0.1 K/UL (ref 0–0.4)
EOSINOPHILS RELATIVE PERCENT: 1 % (ref 1–4)
ERYTHROCYTE [DISTWIDTH] IN BLOOD BY AUTOMATED COUNT: 15.8 % (ref 12.5–15.4)
FLUAV AG SPEC QL: NEGATIVE
FLUBV AG SPEC QL: NEGATIVE
GFR SERPL CREATININE-BSD FRML MDRD: 4 ML/MIN/1.73M2
GLUCOSE SERPL-MCNC: 95 MG/DL (ref 70–99)
HCT VFR BLD AUTO: 37.4 % (ref 36–46)
HGB BLD-MCNC: 12.6 G/DL (ref 12–16)
INR PPP: 1
LYMPHOCYTES NFR BLD: 0.5 K/UL (ref 1–4.8)
LYMPHOCYTES RELATIVE PERCENT: 6 % (ref 24–44)
MCH RBC QN AUTO: 33.7 PG (ref 26–34)
MCHC RBC AUTO-ENTMCNC: 33.7 G/DL (ref 31–37)
MCV RBC AUTO: 99.9 FL (ref 80–100)
MONOCYTES NFR BLD: 0.5 K/UL (ref 0.1–1.2)
MONOCYTES NFR BLD: 6 % (ref 2–11)
NEUTROPHILS NFR BLD: 87 % (ref 36–66)
NEUTS SEG NFR BLD: 6.9 K/UL (ref 1.8–7.7)
PLATELET # BLD AUTO: 226 K/UL (ref 140–450)
PMV BLD AUTO: 8.2 FL (ref 6–12)
POTASSIUM SERPL-SCNC: 4.7 MMOL/L (ref 3.7–5.3)
PROTHROMBIN TIME: 11.1 SEC (ref 9.4–12.6)
RBC # BLD AUTO: 3.74 M/UL (ref 4–5.2)
SARS-COV-2 RDRP RESP QL NAA+PROBE: NOT DETECTED
SODIUM SERPL-SCNC: 132 MMOL/L (ref 135–144)
SPECIMEN DESCRIPTION: NORMAL
TROPONIN I SERPL HS-MCNC: 80 NG/L (ref 0–14)
TROPONIN I SERPL HS-MCNC: 82 NG/L (ref 0–14)
WBC OTHER # BLD: 7.9 K/UL (ref 3.5–11)

## 2024-01-15 PROCEDURE — 6360000002 HC RX W HCPCS: Performed by: EMERGENCY MEDICINE

## 2024-01-15 PROCEDURE — 80048 BASIC METABOLIC PNL TOTAL CA: CPT

## 2024-01-15 PROCEDURE — 96374 THER/PROPH/DIAG INJ IV PUSH: CPT | Performed by: EMERGENCY MEDICINE

## 2024-01-15 PROCEDURE — 36415 COLL VENOUS BLD VENIPUNCTURE: CPT

## 2024-01-15 PROCEDURE — 87804 INFLUENZA ASSAY W/OPTIC: CPT

## 2024-01-15 PROCEDURE — 71250 CT THORAX DX C-: CPT

## 2024-01-15 PROCEDURE — 70450 CT HEAD/BRAIN W/O DYE: CPT

## 2024-01-15 PROCEDURE — 72125 CT NECK SPINE W/O DYE: CPT

## 2024-01-15 PROCEDURE — 87635 SARS-COV-2 COVID-19 AMP PRB: CPT

## 2024-01-15 PROCEDURE — 85025 COMPLETE CBC W/AUTO DIFF WBC: CPT

## 2024-01-15 PROCEDURE — 99284 EMERGENCY DEPT VISIT MOD MDM: CPT | Performed by: EMERGENCY MEDICINE

## 2024-01-15 PROCEDURE — 6370000000 HC RX 637 (ALT 250 FOR IP)

## 2024-01-15 PROCEDURE — 84484 ASSAY OF TROPONIN QUANT: CPT

## 2024-01-15 PROCEDURE — 85610 PROTHROMBIN TIME: CPT

## 2024-01-15 RX ORDER — LIDOCAINE 4 G/G
1 PATCH TOPICAL DAILY
Qty: 30 PATCH | Refills: 0 | Status: SHIPPED | OUTPATIENT
Start: 2024-01-15 | End: 2024-02-14

## 2024-01-15 RX ORDER — MORPHINE SULFATE 2 MG/ML
2 INJECTION, SOLUTION INTRAMUSCULAR; INTRAVENOUS ONCE
Status: COMPLETED | OUTPATIENT
Start: 2024-01-15 | End: 2024-01-15

## 2024-01-15 RX ORDER — HYDROCODONE BITARTRATE AND ACETAMINOPHEN 5; 325 MG/1; MG/1
1 TABLET ORAL ONCE
Status: COMPLETED | OUTPATIENT
Start: 2024-01-15 | End: 2024-01-15

## 2024-01-15 RX ORDER — HYDROCODONE BITARTRATE AND ACETAMINOPHEN 5; 325 MG/1; MG/1
1 TABLET ORAL 2 TIMES DAILY PRN
Qty: 6 TABLET | Refills: 0 | Status: SHIPPED | OUTPATIENT
Start: 2024-01-15 | End: 2024-01-18

## 2024-01-15 RX ORDER — LIDOCAINE 4 G/G
1 PATCH TOPICAL ONCE
Status: DISCONTINUED | OUTPATIENT
Start: 2024-01-15 | End: 2024-01-15 | Stop reason: HOSPADM

## 2024-01-15 RX ORDER — ERYTHROMYCIN 5 MG/G
OINTMENT OPHTHALMIC ONCE
Status: COMPLETED | OUTPATIENT
Start: 2024-01-15 | End: 2024-01-15

## 2024-01-15 RX ORDER — ACETAMINOPHEN 160 MG/5ML
650 LIQUID ORAL ONCE
Status: COMPLETED | OUTPATIENT
Start: 2024-01-15 | End: 2024-01-15

## 2024-01-15 RX ADMIN — MORPHINE SULFATE 2 MG: 2 INJECTION, SOLUTION INTRAMUSCULAR; INTRAVENOUS at 11:54

## 2024-01-15 RX ADMIN — HYDROCODONE BITARTRATE AND ACETAMINOPHEN 1 TABLET: 5; 325 TABLET ORAL at 14:27

## 2024-01-15 RX ADMIN — ERYTHROMYCIN: 5 OINTMENT OPHTHALMIC at 11:52

## 2024-01-15 RX ADMIN — ACETAMINOPHEN 650 MG: 650 SOLUTION ORAL at 13:34

## 2024-01-15 ASSESSMENT — ENCOUNTER SYMPTOMS
NAUSEA: 0
SORE THROAT: 0
PHOTOPHOBIA: 0
SINUS PAIN: 0
ABDOMINAL PAIN: 0
SINUS PRESSURE: 0
VOMITING: 0
SHORTNESS OF BREATH: 0
DIARRHEA: 0
CHEST TIGHTNESS: 0
BACK PAIN: 1
RHINORRHEA: 0
COUGH: 0

## 2024-01-15 ASSESSMENT — PAIN SCALES - GENERAL
PAINLEVEL_OUTOF10: 4
PAINLEVEL_OUTOF10: 5
PAINLEVEL_OUTOF10: 6

## 2024-01-15 ASSESSMENT — PAIN - FUNCTIONAL ASSESSMENT: PAIN_FUNCTIONAL_ASSESSMENT: 0-10

## 2024-01-15 ASSESSMENT — PAIN DESCRIPTION - LOCATION
LOCATION: BACK

## 2024-01-15 NOTE — DISCHARGE INSTRUCTIONS
Please follow-up with your primary care physician.     Take tylenol over the counter, as needed and as directed for pain.    Apply lidoaine patch to affected area. 12 hours on and 12 hours off, no more than three patches at one time.    Apply ice indirectly to affected area, 20 minutes at a time, 3-4 times daily.    PLEASE RETURN TO THE EMERGENCY DEPARTMENT IMMEDIATELY if your symptoms worsen in anyway or in 8-12 hours if not improved for re-evaluation.  You should immediately return to the ER for symptoms such as increasing pain, bloody stool, fever, a feeling of passing out, light headed, dizziness, chest pain, shortness of breath, persistent nausea and/or vomiting, numbness or weakness to the arms or legs, coolness or color change of the arms or legs.      Take your medication as indicated and prescribed.  If you are given an antibiotic then, make sure you get the prescription filled and take the antibiotics until finished.      THANK YOU!!!    From [facility] and [facility]    On behalf of the Emergency Department staff at [facility], I would like to thank you for giving us the opportunity to address your health care needs and concerns.    We hope that during your visit, our service was delivered in a professional and caring manner. Please keep [facility] in mind as we walk with you down the path to your own personal wellness.     Please expect an automated text message or email from us so we can ask a few questions about your health and progress. Based on your answers, a clinician may call you back to offer help and instructions.    Please understand that early in the process of an illness or injury, an emergency department workup can be falsely reassuring.  If you notice any worsening, changing or persistent symptoms please call your family doctor or return to the ER immediately.     Tell us how we did during your visit at [email or web address]   and let us know about your

## 2024-01-15 NOTE — ED PROVIDER NOTES
[facility] Emergency Department  [address]  [facility] [address]  Phone: [phone]  Fax: [phone]      Attending Physician Attestation    I performed a history and physical examination of the patient and discussed management with the mid level provider. I reviewed the mid level provider's note and agree with the documented findings and plan of care. Any areas of disagreement are noted on the chart. I was personally present for the key portions of any procedures. I have documented in the chart those procedures where I was not present during the key portions. I have reviewed the emergency nurses triage note. I agree with the chief complaint, past medical history, past surgical history, allergies, medications, social and family history as documented unless otherwise noted below. Documentation of the HPI, Physical Exam and Medical Decision Making performed by mid level providers is based on my personal performance of the HPI, PE and MDM. For Physician Assistant/ Nurse Practitioner cases/documentation I have personally evaluated this patient and have completed at least one if not all key elements of the E/M (history, physical exam, and MDM). Additional findings are as noted.      CHIEF COMPLAINT       Chief Complaint   Patient presents with    Fall    Head Injury         HISTORY OF PRESENT ILLNESS    Nehal Garces is a 89 y.o. female who presents complaining of back pain and head and hand pain.  Patient was at the nursing home was being assisted to the bathroom by an aide when she fell.  States that she was fine and sent her on to dialysis today.  Dialysis was worried about the level of pain that she was sent here.2    PAST MEDICAL HISTORY    has a past medical history of Hx of blood clots, Hyperlipidemia, Hypertension, Pulmonary embolism (HCC), and Thyroid disease.    SURGICAL HISTORY      has a past surgical history that includes IR NONTUNNELED VASCULAR CATHETER > 5 
used smokeless tobacco. She reports that she does not currently use alcohol. She reports that she does not use drugs.  Family History: She indicated that her mother is . She indicated that her father is .   family history is not on file.  Psychiatric History: None    Allergies: Penicillins, Dilaudid [hydromorphone hcl], and Meloxicam    Home Medications:   Prior to Admission medications    Medication Sig Start Date End Date Taking? Authorizing Provider   HYDROcodone-acetaminophen (NORCO) 5-325 MG per tablet Take 1 tablet by mouth 2 times daily as needed for Pain for up to 3 days. Intended supply: 3 days. Take lowest dose possible to manage pain Max Daily Amount: 2 tablets 1/15/24 1/18/24 Yes Jennifer Rincon APRN - CNP   lidocaine 4 % external patch Place 1 patch onto the skin daily 1/15/24 2/14/24 Yes Jennifer Rincon APRN - CNP   nystatin (MYCOSTATIN) 811591 UNIT/ML suspension Take 5 mLs by mouth 4 times daily 23   Godfrey Farmer MD   amLODIPine (NORVASC) 10 MG tablet Take 1 tablet by mouth daily 23   Godfrey Farmer MD   apixaban (ELIQUIS) 2.5 MG TABS tablet Take 1 tablet by mouth 2 times daily    Darion Kumari MD   ezetimibe (ZETIA) 10 MG tablet Take 1 tablet by mouth daily 20   Darion Kumari MD   levothyroxine (SYNTHROID) 100 MCG tablet Take 1 tablet by mouth daily 18   Darion Kumari MD   pantoprazole (PROTONIX) 40 MG tablet Take 1 tablet by mouth daily 18   Darion Kumari MD       REVIEW OF SYSTEMS  (2-9 systems for level 4, 10 ormore for level 5)      Review of Systems   Constitutional:  Negative for chills, diaphoresis, fatigue and fever.   HENT:  Negative for congestion, ear pain, rhinorrhea, sinus pressure, sinus pain and sore throat.    Eyes:  Negative for photophobia and visual disturbance.   Respiratory:  Negative for cough, chest tightness and shortness of breath.    Cardiovascular:  Negative for chest pain, palpitations and leg

## 2024-01-17 LAB
EKG ATRIAL RATE: 70 BPM
EKG P AXIS: 50 DEGREES
EKG P-R INTERVAL: 148 MS
EKG Q-T INTERVAL: 400 MS
EKG QRS DURATION: 70 MS
EKG QTC CALCULATION (BAZETT): 432 MS
EKG R AXIS: 6 DEGREES
EKG T AXIS: 43 DEGREES
EKG VENTRICULAR RATE: 70 BPM

## 2024-08-21 NOTE — DISCHARGE INSTR - COC
Continuity of Care Form    Patient Name: Charmayne Ready   :    MRN:  [id number]    Admit date:  2023  Discharge date:  2023    Code Status Order: Full Code   Advance Directives:   Advance Care Flowsheet Documentation       Date/Time Healthcare Directive Type of Healthcare Directive Copy in [address] Agent's Name Healthcare Agent's Phone Number    [phone] Yes, patient has an advance directive for healthcare treatment Durable power of  for health care Yes, copy in chart Healthcare power of  Lismiranda Parrishmond --            Admitting Physician:  No admitting provider for patient encounter. PCP: Lui Corley MD    Discharging Nurse: Gibran Corrales, [address] [employer] [address]/Room#: 0501/0501-01  Discharging Unit Phone Number: [phone]    Emergency Contact:   Extended Emergency Contact Information  Primary Emergency Contact: [address] [place] [address] Phone: [phone]  Relation: Other  Secondary Emergency Contact: KristynJaylen  Home Phone: [phone]  Relation: Niece/Nephew    Past Surgical History:  Past Surgical History:   Procedure Laterality Date    ERCP N/A 2023    ERCP STONE REMOVAL performed by Johnathan Melvin MD at [address] [facility] NONTUNNELED VASCULAR CATHETER  2023    IR NONTUNNELED VASCULAR CATHETER 2023 Vanessa Paul MD [facility] SPECIAL PROCEDURES       Immunization History: There is no immunization history on file for this patient.     Active Problems:  Patient Active Problem List   Diagnosis Code    Acute kidney injury ([address]) N17.9    DARIUSZ (acute kidney injury) ([address]) N17.9    Hypertension, essential I10    Duodenal ulcer K26.9    Hyperlipidemia E78.5    Hypothyroidism E03.9    Pulmonary embolism (HCC) I26.99    Edema R60.9    Hyperkalemia E87.5    Confusion R41.0    Goals of care, counseling/discussion Z71.89    Encounter for palliative care Z51.5    Choledocholithiasis K80.50    Multiple myeloma not having achieved From: Eve Garcia  To: Chiara Chavez  Sent: 8/21/2024 7:46 AM CDT  Subject: Mucus in stool, still loose, nauseous, upset stomach    Hi I am not having any blood in my stool but I am still having looser stools. I started to feel nauseous yesterday and into today. My stomach feels unsettled and I am still not 100%. I just noticed mucus in my stool this morning. I do not feel like I need the emergency room, however I am just worried there is still something wrong if I am having more sudden onset of symptoms. I have also been eating a healthy bland diet and taking probiotics, and digestive enzymes with food. Thanks for your guidance as to what I should do.    Status/Restrictions: No weight bearing restrictions  Other Medical Equipment (for information only, NOT a DME order):  walker, bath bench, and bedside commode  Other Treatments: N/A    Patient's personal belongings (please select all that are sent with patient): Francisca mg RN SIGNATURE:  Electronically signed by Janene Dumont RN on 9/29/23 at 9:46 AM EDT    CASE MANAGEMENT/SOCIAL WORK SECTION    Inpatient Status Date: ***    Readmission Risk Assessment Score:  Readmission Risk              Risk of Unplanned Readmission:  21           Discharging to Facility/ Agency   Name: [facility]  Address:  ZJQNP:[phone]  Fax:    Dialysis Facility (if applicable)   Name: [facility]  Address: [address]  Dialysis Schedule: [facility] 5:15am  Phone: [phone]  Fax: [phone]    / signature: Electronically signed by Gisel Fan RN on 9/28/23 at 3:55 PM EDT    PHYSICIAN SECTION    Prognosis: Fair    Condition at Discharge: Stable    Rehab Potential (if transferring to Rehab): Fair    Recommended Labs or Other Treatments After Discharge: follow up with nephrology     Physician Certification: I certify the above information and transfer of Jan Reinoso  is necessary for the continuing treatment of the diagnosis listed and that she requires [address] for less 30 days.      Update Admission H&P: Changes in H&P as follows - see dc summary     PHYSICIAN SIGNATURE:  Electronically signed by Marzena Bonner MD on 9/25/23 at 5:47 PM EDT

## 2024-12-06 NOTE — TELEPHONE ENCOUNTER
Call placed to [facility] and confirmed that palliative care referral was received and they will be calling to set up appointment.     Biju Hernandez Coordinator  Deshawn Stokes BSN, [address] [phone]
06-Dec-2024 22:59

## (undated) DEVICE — STRAP,POSITIONING,KNEE/BODY,FOAM,4X60": Brand: MEDLINE

## (undated) DEVICE — ELECTRODE PT RET AD L9FT HI MOIST COND ADH HYDRGEL CORDED

## (undated) DEVICE — SPHINCTEROTOME: Brand: DREAMTOME™ RX 44

## (undated) DEVICE — RETRIEVAL BALLOON CATHETER: Brand: EXTRACTOR™ PRO RX